# Patient Record
Sex: FEMALE | Race: WHITE | Employment: FULL TIME | URBAN - METROPOLITAN AREA
[De-identification: names, ages, dates, MRNs, and addresses within clinical notes are randomized per-mention and may not be internally consistent; named-entity substitution may affect disease eponyms.]

---

## 2018-01-27 ENCOUNTER — APPOINTMENT (OUTPATIENT)
Dept: GENERAL RADIOLOGY | Facility: CLINIC | Age: 58
End: 2018-01-27
Attending: EMERGENCY MEDICINE
Payer: COMMERCIAL

## 2018-01-27 ENCOUNTER — HOSPITAL ENCOUNTER (EMERGENCY)
Facility: CLINIC | Age: 58
Discharge: HOME | End: 2018-01-27
Attending: EMERGENCY MEDICINE | Admitting: EMERGENCY MEDICINE
Payer: COMMERCIAL

## 2018-01-27 VITALS
WEIGHT: 170 LBS | OXYGEN SATURATION: 96 % | BODY MASS INDEX: 29.02 KG/M2 | TEMPERATURE: 98.2 F | SYSTOLIC BLOOD PRESSURE: 136 MMHG | DIASTOLIC BLOOD PRESSURE: 91 MMHG | RESPIRATION RATE: 18 BRPM | HEIGHT: 64 IN | HEART RATE: 71 BPM

## 2018-01-27 DIAGNOSIS — S42.294A OTHER CLOSED NONDISPLACED FRACTURE OF PROXIMAL END OF RIGHT HUMERUS, INITIAL ENCOUNTER: ICD-10-CM
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
## 2018-01-27 DIAGNOSIS — S52.121A CLOSED DISPLACED FRACTURE OF HEAD OF RIGHT RADIUS, INITIAL ENCOUNTER: ICD-10-CM

## 2018-01-27 PROCEDURE — 73060 X-RAY EXAM OF HUMERUS: CPT | Mod: RT

## 2018-01-27 PROCEDURE — 25000132 ZZH RX MED GY IP 250 OP 250 PS 637: Performed by: EMERGENCY MEDICINE

## 2018-01-27 PROCEDURE — 25000128 H RX IP 250 OP 636: Performed by: EMERGENCY MEDICINE

## 2018-01-27 PROCEDURE — 73080 X-RAY EXAM OF ELBOW: CPT | Mod: RT

## 2018-01-27 PROCEDURE — 73030 X-RAY EXAM OF SHOULDER: CPT | Mod: RT

## 2018-01-27 PROCEDURE — 71045 X-RAY EXAM CHEST 1 VIEW: CPT

## 2018-01-27 PROCEDURE — 99285 EMERGENCY DEPT VISIT HI MDM: CPT

## 2018-01-27 RX ORDER — OXYCODONE HYDROCHLORIDE 5 MG/1
5-10 TABLET ORAL EVERY 6 HOURS PRN
Qty: 12 TABLET | Refills: 0 | Status: SHIPPED | OUTPATIENT
Start: 2018-01-27 | End: 2020-01-03

## 2018-01-27 RX ORDER — IBUPROFEN 600 MG/1
600 TABLET, FILM COATED ORAL ONCE
Status: COMPLETED | OUTPATIENT
Start: 2018-01-27 | End: 2018-01-27

## 2018-01-27 RX ORDER — OXYCODONE HYDROCHLORIDE 5 MG/1
5 TABLET ORAL ONCE
Status: COMPLETED | OUTPATIENT
Start: 2018-01-27 | End: 2018-01-27

## 2018-01-27 RX ORDER — FENTANYL CITRATE 50 UG/ML
100 INJECTION, SOLUTION INTRAMUSCULAR; INTRAVENOUS ONCE
Status: COMPLETED | OUTPATIENT
Start: 2018-01-27 | End: 2018-01-27

## 2018-01-27 RX ORDER — ACETAMINOPHEN 500 MG
1000 TABLET ORAL ONCE
Status: COMPLETED | OUTPATIENT
Start: 2018-01-27 | End: 2018-01-27

## 2018-01-27 RX ADMIN — ACETAMINOPHEN 1000 MG: 500 TABLET, FILM COATED ORAL at 18:22

## 2018-01-27 RX ADMIN — FENTANYL CITRATE 100 MCG: 50 INJECTION INTRAMUSCULAR; INTRAVENOUS at 21:08

## 2018-01-27 RX ADMIN — IBUPROFEN 600 MG: 600 TABLET ORAL at 18:22

## 2018-01-27 RX ADMIN — OXYCODONE HYDROCHLORIDE 5 MG: 5 TABLET ORAL at 21:24

## 2018-01-27 ASSESSMENT — ENCOUNTER SYMPTOMS
HEADACHES: 0
BACK PAIN: 0
VOMITING: 0

## 2018-01-27 NOTE — ED AVS SNAPSHOT
[facility] Emergency Department    [address] [place] [address]    Phone:  [phone]    Fax:  [phone]                                       Juli Mullen   MRN: [id number]    Department:  [facility] Emergency Department   Date of Visit:  1/27/2018           Patient Information     Date Of Birth          1960        Your diagnoses for this visit were:     Fall, initial encounter     Other closed nondisplaced fracture of proximal end of right humerus, initial encounter     Closed displaced fracture of head of right radius, initial encounter        You were seen by Bola Valente MD.        Discharge Instructions       Please make an appointment to follow up with [facility] [phone] HAND SURGERY in 2 days .    Sling until seen by orthopedics    Use tylenol and/or ibuprofen for pain or discomfort    Use Oxycodone for severe pain uncontrolled by above medications    Opioid Medication Information  You have been given a prescription for an opioid (narcotic) pain medicine and/or have received a pain medicine while here in the emergency department. These medicines can make you drowsy or impaired. You must not drive, operate dangerous equipment, or engage in any other dangerous activities while taking these medications. If you drive while taking these medications, you could be arrested for DUI, or driving under the influence. Do not drink any alcohol while you are taking these medications.   Opioid pain medications can cause addiction. If you have a history of chemical dependency of any type, you are at a higher risk of becoming addicted to pain medications. Only take these prescribed medications to treat your pain when all other options have been tried. Take it for as short a time and as few doses as possible. Store your pain pills in a secure place, as they are frequently stolen and provide a dangerous opportunity for children or visitors in  your house to start abusing these powerful medications. We will not replace any lost or stolen medicine. As soon as your pain is better, you should flush all your remaining medication.   Many prescription pain medications contain Tylenol (acetaminophen), including Vicodin, Tylenol #3, Norco, Lortab, and Percocet. You should not take any extra pills of Tylenol if you are using these prescription medications or you can get very sick. Do not ever take more than 4000 mg of acetaminophen in any 24 hour period.  All opioids tend to cause constipation. Drink plenty of water and eat foods that have a lot of fiber, such as fruits, vegetables, prune juice, apple juice and high fiber cereal. Take a laxative if you don t move your bowels at least every other day. Miralax, Milk of Magnesia, Colace, or Senna can be used to keep you regular.                Elbow Fracture    You have a break (fracture) of one or more bones of your elbow joint. This may be a small crack in the bone. Or it may be a major break, with the broken parts pushed out of position.  This fracture usually takes 4 to 12 weeks to heal, depending on the type. The first step in treatment is with a splint or cast. Severe fractures may need surgery to put the bone fragments back into place.  Home care  Follow these guidelines when caring for yourself at home:    Keep your arm elevated to reduce pain and swelling. When sitting or lying down keep your arm above the level of your heart. You can do this by placing your arm on a pillow that rests on your chest or on a pillow at your side. This is most important during the first 2 days (48 hours) after the injury.    Put an ice pack on the injured area. Do this for 20 minutes every 1 to 2 hours the first day. You can make an ice pack by wrapping a plastic bag of ice cubes in a thin towel. As the ice melts, be careful that the cast or splint doesn t get wet. You can place the ice pack inside the sling and directly over the  splint or cast. Continue to use the ice pack 3 to 4 times a day for the next 2 days. Then use the ice pack as needed to ease pain and swelling.    Keep the splint or cast completely dry at all times. Bathe with your splint or cast out of the water. Protect it with a large plastic bag, rubber-banded at the top end. If a fiberglass splint or cast gets wet, you can dry it with a hair dryer.    You may use acetaminophen or ibuprofen to control pain, unless another pain medicine was prescribed. If you have chronic liver or kidney disease, talk with your healthcare provider before using these medicines. Also talk with your provider if you ve had a stomach ulcer or gastrointestinal bleeding.    Don t put creams or objects under the cast if you have itching.  Follow-up care  Follow up with your healthcare provider in 1 week, or as advised. This is to make sure the bone is healing the way it should. If a splint was put on, it may be changed to a cast during your follow-up visit.  X-rays may be taken. You will be told of any new findings that may affect your care.  When to seek medical advice  Call your healthcare provider right away if any of these occur:    The cast or splint cracks    The plaster cast or splint becomes wet or soft    The fiberglass cast or splint stays wet for more than 24 hours    Tightness or pain under the cast or splint gets worse    Bad odor from the cast or wound fluid stains the cast    Fingers become swollen, cold, blue, numb, or tingly    You can t move your fingers    Skin around cast becomes red    Fever of 100.4 F (38 C) or higher, or as directed by your healthcare provider   Date Last Reviewed: 2/6/2017 2000-2017 [employer]. [address] [place] [address], [place] [address]. All rights reserved. This information is not intended as a substitute for professional medical care. Always follow your healthcare professional's instructions.          Shoulder Fracture  You have a break  (fracture) of the shoulder. This may be a small crack in the bone. Or it may be a major break with the broken parts pushed out of position.     If you have only a crack in the bone and no bone fragments are out of place, you will probably be treated with a shoulder immobilizer. This is a special type of sling. Casts are usually not used for this type of fracture. Your bone should heal in 4 to 8 weeks. More serious injuries may need surgery to put the bones back into the correct position for healing.  Home care  Follow these tips to care for yourself at home:    Leave the shoulder immobilizer in place. This will support the injured arm at your side. This is the best position for the bone to heal.    The shoulder immobilizer is adjustable. If it becomes loose, adjust it so that your forearm is level with the ground (horizontal). Your hand should be level with your elbow.    Apply an ice pack to the injured area for 20 minutes every 1 to 2 hours the first day. You can make an ice pack by putting ice cubes in a plastic bag. A bag of frozen peas or something similar works well too. Wrap the bag in a towel before putting it on your shoulder. Continue with ice packs 3 to 4 times a day for the next 2 to 3 days. Then use the ice as needed to relieve pain and swelling.    You may take acetaminophen or ibuprofen to relieve pain, unless another pain medicine was prescribed. If you have chronic liver or kidney disease or ever had a stomach ulcer or gastrointestinal bleeding, talk with your doctor before using these medicines.    Don t take the sling off before your next exam unless you were told to do so. Ask if you should move your elbow, wrist, and hand.  Follow-up care  Follow up with your healthcare provider, or as advised.  A shoulder joint will become stiff if left in a sling for too long. Ask your doctor when it is safe to begin range-of-motion exercises.  When to seek medical advice  Call your healthcare provider right  away if any of these occur:    Your fingers become swollen, cold, blue, numb, or tingly    Your shoulder or upper arm swells a lot or looks very bruised    The pain in your shoulder gets worse    The splint or immobilizer breaks    You have a fever or chills  Date Last Reviewed: 8/1/2016 2000-2017 The "[employer]. [address] [place] [address]. All rights reserved. This information is not intended as a substitute for professional medical care. Always follow your healthcare professional's instructions.              24 Hour Appointment Hotline       To make an appointment at any [facility], call [phone] ([phone]). If you don't have a family doctor or clinic, we will help you find one. [facility] are conveniently located to serve the needs of you and your family.             Review of your medicines      START taking        Dose / Directions Last dose taken    oxyCODONE IR 5 MG tablet   Commonly known as:  ROXICODONE   Dose:  5-10 mg   Quantity:  12 tablet        Take 1-2 tablets (5-10 mg) by mouth every 6 hours as needed for moderate to severe pain   Refills:  0          Our records show that you are taking the medicines listed below. If these are incorrect, please call your family doctor or clinic.        Dose / Directions Last dose taken    ACYCLOVIR PO        Refills:  0                Prescriptions were sent or printed at these locations (1 Prescription)                   Other Prescriptions                Printed at Department/Unit printer (1 of 1)         oxyCODONE IR (ROXICODONE) 5 MG tablet                Procedures and tests performed during your visit     Humerus XR, G/E 2 views, right    XR Chest 1 View    XR Elbow Right G/E 3 Views    XR Shoulder Right G/E 3 Views      Orders Needing Specimen Collection     None      Pending Results     No orders found from 1/25/2018 to 1/28/2018.            Pending Culture Results     No orders found from 1/25/2018 to  1/28/2018.            Pending Results Instructions     If you had any lab results that were not finalized at the time of your Discharge, you can call the ED Lab Result RN at [phone]. You will be contacted by this team for any positive Lab results or changes in treatment. The nurses are available 7 days a week from 10A to 6:30P.  You can leave a message 24 hours per day and they will return your call.        Test Results From Your Hospital Stay        1/27/2018  8:10 PM      Narrative     CHEST ONE VIEW  1/27/2018 7:39 PM     HISTORY: Fall, right shoulder and scapular pain.     COMPARISON: None.        Impression     IMPRESSION: Normal.    AVNI ZEPEDA MD         1/27/2018  8:10 PM      Narrative     RIGHT SHOULDER THREE VIEWS  1/27/2018 7:39 PM     HISTORY: Fall, pain.     COMPARISON: None.        Impression     IMPRESSION: Mildly comminuted fracture of the proximal humerus which  is relatively well aligned is present. There is no evidence for  dislocation.    AVNI ZEPEDA MD         1/27/2018  8:20 PM      Narrative     HUMERUS RIGHT TWO OR MORE VIEWS   1/27/2018 7:40 PM     HISTORY: Fall, pain.     COMPARISON: None.         Impression     IMPRESSION: There is a comminuted proximal humeral neck fracture with  minimal lateral displacement of largest distal fragment. Humeral head  remains well located within the glenoid fossa.    IRINA LEY MD         1/27/2018  8:20 PM      Narrative     ELBOW RIGHT THREE OR MORE VIEWS   1/27/2018 7:40 PM     HISTORY: Fall, pain.     COMPARISON: Humeral x-rays dated 1/27/2018.        Impression     IMPRESSION: Images are limited due to the angulation. There is what  appears to be a comminuted intra-articular and probably displaced  radial head fracture. Joint spaces are grossly maintained. There may  be other fractures which are difficult to see given the limitations of  this study.    IIRNA LEY MD                Clinical Quality Measure: Blood Pressure Screening      "Your blood pressure was checked while you were in the emergency department today. The last reading we obtained was  BP: (!) 160/94 . Please read the guidelines below about what these numbers mean and what you should do about them.  If your systolic blood pressure (the top number) is less than 120 and your diastolic blood pressure (the bottom number) is less than 80, then your blood pressure is normal. There is nothing more that you need to do about it.  If your systolic blood pressure (the top number) is 120-139 or your diastolic blood pressure (the bottom number) is 80-89, your blood pressure may be higher than it should be. You should have your blood pressure rechecked within a year by a primary care provider.  If your systolic blood pressure (the top number) is 140 or greater or your diastolic blood pressure (the bottom number) is 90 or greater, you may have high blood pressure. High blood pressure is treatable, but if left untreated over time it can put you at risk for heart attack, stroke, or kidney failure. You should have your blood pressure rechecked by a primary care provider within the next 4 weeks.  If your provider in the emergency department today gave you specific instructions to follow-up with your doctor or provider even sooner than that, you should follow that instruction and not wait for up to 4 weeks for your follow-up visit.        Thank you for choosing [facility]       Thank you for choosing [facility] for your care. Our goal is always to provide you with excellent care. Hearing back from our patients is one way we can continue to improve our services. Please take a few minutes to complete the written survey that you may receive in the mail after you visit with us. Thank you!        [facility] [employer] Information     [employer] lets you send messages to your doctor, view your test results, renew your prescriptions, schedule appointments and more. To sign up, go to [email or web address] . Click on \"Log in\" on " "the left side of the screen, which will take you to the Welcome page. Then click on \"Sign up Now\" on the right side of the page.     You will be asked to enter the access code listed below, as well as some personal information. Please follow the directions to create your username and password.     Your access code is: [email or web address]  Expires: 2018  9:30 PM     Your access code will  in 90 days. If you need help or a new code, please call your [facility] clinic or [phone].        Care EveryWhere ID     This is your Care EveryWhere ID. This could be used by other organizations to access your [facility] medical records  [id number]        Equal Access to Services     RHONDA COSBY : Marshall Watt, christian bond, vidal nguyen, adalid sahni. So [facility] [phone].    ATENCIÓN: Si habla español, tiene a castaneda disposición servicios gratuitos de asistencia lingüística. Llame al [phone].    We comply with applicable federal civil rights laws and [place] laws. We do not discriminate on the basis of race, color, national origin, age, disability, sex, sexual orientation, or gender identity.            After Visit Summary       This is your record. Keep this with you and show to your community pharmacist(s) and doctor(s) at your next visit.                  "

## 2018-01-27 NOTE — ED AVS SNAPSHOT
[facility] Emergency Department    [address]    [facility] [address]    Phone:  [phone]    Fax:  [phone]                                       Juli Mullen   MRN: [id number]    Department:  [facility] Emergency Department   Date of Visit:  1/27/2018           After Visit Summary Signature Page     I have received my discharge instructions, and my questions have been answered. I have discussed any challenges I see with this plan with the nurse or doctor.    ..........................................................................................................................................  Patient/Patient Representative Signature      ..........................................................................................................................................  Patient Representative Print Name and Relationship to Patient    ..................................................               ................................................  Date                                            Time    ..........................................................................................................................................  Reviewed by Signature/Title    ...................................................              ..............................................  Date                                                            Time

## 2018-01-28 NOTE — ED PROVIDER NOTES
"  History     Chief Complaint:  Fall    HPI   Juli Mullen is a generally healthy 57 year old female who presents with injuries after a fall. The patient states that she was on a ladder painting when she had fallen backwards off the ladder approximately 4 feet at 1500. She had initially visited an urgent care facility, but was sent to the ED for further evaluation. Here, the patient states that her right shoulder had taken most of the impact, but had hit her head. She denies loss of consciousness or vomiting after the fall. She denies use of blood thinners, back or leg pain.     Allergies:  NKDA     Medications:    The patient is currently on no regular medications.      Past Medical History:    The patient denies any significant past medical history.    Past Surgical History:    The patient does not have any pertinent past surgical history  Family  History:    No past pertinent family history.     Social History:  Negative for alcohol use.  Negative for tobacco use.      Review of Systems   Gastrointestinal: Negative for vomiting.   Musculoskeletal: Negative for back pain.        Right shoulder pain    Neurological: Negative for headaches.   All other systems reviewed and are negative.      Physical Exam   First Vitals:  BP: (!) 160/94  Pulse: 71  Heart Rate: 71  Temp: 98.2  F (36.8  C)  Resp: 18  Height: 162.6 cm (5' 4\")  Weight: 77.1 kg (170 lb)  SpO2: 95 %    Physical Exam   HENT:   Head: Atraumatic.   Right Ear: External ear normal.   Left Ear: External ear normal.   Nose: Nose normal.   Eyes: Conjunctivae and lids are normal.   Neck: Neck supple. No tracheal deviation present.   No midline tenderness pain with range of motion   Cardiovascular: Regular rhythm and intact distal pulses.    Pulmonary/Chest: Breath sounds normal. No respiratory distress.   Abdominal: Soft. There is no tenderness. There is no rebound and no guarding.   Musculoskeletal:   Skeletal survey unremarkable with exception of the " below.    Right upper extremity: There is tenderness palpation and mild soft tissue swelling at the right elbow tenderness both over the medial lateral epicondyles.  Limited range of motion secondary to pain no palpable bony deformity.  Distally at the wrist and hand CMS is intact.  There is also tenderness in the proximal humerus.  Limited range of motion at the shoulder secondary to pain.  There is also pain extending posteriorly along the lateral scapula into the acromion.   Neurological:   MAEE, no gross focal motor or sensory deficit   Skin: Skin is warm and dry. She is not diaphoretic.   Psychiatric: She has a normal mood and affect.   Nursing note and vitals reviewed.      Emergency Department Course     Imaging:  Radiographic findings were communicated with the patient who voiced understanding of the findings.  XR Chest 1 view:   Normal. As per radiology.     XR Shoulder, Right, G/E 3 views:   Mildly comminuted fracture of the proximal humerus which  is relatively well aligned is present. There is no evidence for  dislocation. As per radiology.     XR Humerus, Right, G/E 2 views:   There is a comminuted proximal humeral neck fracture with  minimal lateral displacement of largest distal fragment. Humeral head  remains well located within the glenoid fossa. As per radiology.     XR Elbow, Right, G/E 3 views:   Images are limited due to the angulation. There is what  appears to be a comminuted intra-articular and probably displaced  radial head fracture. Joint spaces are grossly maintained. There may  be other fractures which are difficult to see given the limitations of  this study. As per radiology.     Interventions:  1822 Tylenol, 1000 mg, PO  1822 Advil, 600 mg, PO   2108 Fentanyl, 100 mg, Nasal  2124 Oxycodone, 5 mg, PO    Emergency Department Course:  Nursing notes and vitals reviewed.     1806  I performed an exam of the patient as documented above.     Medicine administered as documented above.     The  patient was sent for a chest, shoulder, humerus, and elbow XR while in the emergency department, findings above.      I consulted with Dr. Gee, orthopedics, regarding the patient's history and presentation here in the emergency department.      I rechecked the patient and discussed the results of her workup thus far.     I rechecked the patient.     Findings and plan explained to the Patient. Patient discharged home with instructions regarding supportive care, medications, and reasons to return. The importance of close follow-up was reviewed. The patient was prescribed Oxycodone.      Impression & Plan      Medical Decision Makin-year-old female here with mechanical fall off a ladder of 3-4 feet no significant head injury clinically I do not think she is a CT scan of the head or cervical spine.  Her skeletal survey is most concerning for a proximal humerus fracture versus a right elbow fracture.  There is no neurovascular compromise distally she has no motor or sensory deficits in the right upper extremity.  We will do radiographs of the chest right shoulder and humerus down to the elbow.      Update: Unfortunately radiographs show both a proximal humerus fracture as well as a displaced radial head fracture.  Discussed with orthopedic on-call trauma Dr. Gee. She will need a sling for comfort and close follow-up likely radial head replacement surgery.  We discussed the utility of attempting a closed reduction of this radial head which is likely to be unsuccessful would also be concerned that significantly manipulating at the elbow will cause more pain or worsening shoulder injury.  Discussed these options with the patient we elected to not attempt to relocate the radial head and go ahead with sling for comfort of her follow-up with orthopedic surgery on Monday.  She will remain in a sling until seen by orthopedics return with uncontrolled pain numbness weakness or tingling in the wrist or  hand.        Diagnosis:    ICD-10-CM   1. Fall, initial encounter W19.XXXA   2. Other closed nondisplaced fracture of proximal end of right humerus, initial encounter S42.294A   3. Closed displaced fracture of head of right radius, initial encounter S52.121A       Disposition:  discharged to home    Discharge Medications:   Details   oxyCODONE IR (ROXICODONE) 5 MG tablet Take 1-2 tablets (5-10 mg) by mouth every 6 hours as needed for moderate to severe pain, Disp-12 tablet, R-0, Local Print     I, Stephanie Longoria, am serving as a scribe on 1/27/2018 at 6:06 PM to personally document services performed by Bola Valente MD based on my observations and the provider's statements to me.      Stephanie Longoria  1/27/2018   [facility] EMERGENCY DEPARTMENT       Bola Valente MD  01/28/18 0129

## 2018-01-28 NOTE — DISCHARGE INSTRUCTIONS
Please make an appointment to follow up with [facility] [phone] HAND SURGERY in 2 days .    Sling until seen by orthopedics    Use tylenol and/or ibuprofen for pain or discomfort    Use Oxycodone for severe pain uncontrolled by above medications    Opioid Medication Information  You have been given a prescription for an opioid (narcotic) pain medicine and/or have received a pain medicine while here in the emergency department. These medicines can make you drowsy or impaired. You must not drive, operate dangerous equipment, or engage in any other dangerous activities while taking these medications. If you drive while taking these medications, you could be arrested for DUI, or driving under the influence. Do not drink any alcohol while you are taking these medications.   Opioid pain medications can cause addiction. If you have a history of chemical dependency of any type, you are at a higher risk of becoming addicted to pain medications. Only take these prescribed medications to treat your pain when all other options have been tried. Take it for as short a time and as few doses as possible. Store your pain pills in a secure place, as they are frequently stolen and provide a dangerous opportunity for children or visitors in your house to start abusing these powerful medications. We will not replace any lost or stolen medicine. As soon as your pain is better, you should flush all your remaining medication.   Many prescription pain medications contain Tylenol (acetaminophen), including Vicodin, Tylenol #3, Norco, Lortab, and Percocet. You should not take any extra pills of Tylenol if you are using these prescription medications or you can get very sick. Do not ever take more than 4000 mg of acetaminophen in any 24 hour period.  All opioids tend to cause constipation. Drink plenty of water and eat foods that have a lot of fiber, such as fruits, vegetables, prune juice, apple juice and high fiber cereal. Take  a laxative if you don t move your bowels at least every other day. Miralax, Milk of Magnesia, Colace, or Senna can be used to keep you regular.                Elbow Fracture    You have a break (fracture) of one or more bones of your elbow joint. This may be a small crack in the bone. Or it may be a major break, with the broken parts pushed out of position.  This fracture usually takes 4 to 12 weeks to heal, depending on the type. The first step in treatment is with a splint or cast. Severe fractures may need surgery to put the bone fragments back into place.  Home care  Follow these guidelines when caring for yourself at home:    Keep your arm elevated to reduce pain and swelling. When sitting or lying down keep your arm above the level of your heart. You can do this by placing your arm on a pillow that rests on your chest or on a pillow at your side. This is most important during the first 2 days (48 hours) after the injury.    Put an ice pack on the injured area. Do this for 20 minutes every 1 to 2 hours the first day. You can make an ice pack by wrapping a plastic bag of ice cubes in a thin towel. As the ice melts, be careful that the cast or splint doesn t get wet. You can place the ice pack inside the sling and directly over the splint or cast. Continue to use the ice pack 3 to 4 times a day for the next 2 days. Then use the ice pack as needed to ease pain and swelling.    Keep the splint or cast completely dry at all times. Bathe with your splint or cast out of the water. Protect it with a large plastic bag, rubber-banded at the top end. If a fiberglass splint or cast gets wet, you can dry it with a hair dryer.    You may use acetaminophen or ibuprofen to control pain, unless another pain medicine was prescribed. If you have chronic liver or kidney disease, talk with your healthcare provider before using these medicines. Also talk with your provider if you ve had a stomach ulcer or gastrointestinal  bleeding.    Don t put creams or objects under the cast if you have itching.  Follow-up care  Follow up with your healthcare provider in 1 week, or as advised. This is to make sure the bone is healing the way it should. If a splint was put on, it may be changed to a cast during your follow-up visit.  X-rays may be taken. You will be told of any new findings that may affect your care.  When to seek medical advice  Call your healthcare provider right away if any of these occur:    The cast or splint cracks    The plaster cast or splint becomes wet or soft    The fiberglass cast or splint stays wet for more than 24 hours    Tightness or pain under the cast or splint gets worse    Bad odor from the cast or wound fluid stains the cast    Fingers become swollen, cold, blue, numb, or tingly    You can t move your fingers    Skin around cast becomes red    Fever of 100.4 F (38 C) or higher, or as directed by your healthcare provider   Date Last Reviewed: 2/6/2017 2000-2017 [employer]. [address] [place] [address]. All rights reserved. This information is not intended as a substitute for professional medical care. Always follow your healthcare professional's instructions.          Shoulder Fracture  You have a break (fracture) of the shoulder. This may be a small crack in the bone. Or it may be a major break with the broken parts pushed out of position.     If you have only a crack in the bone and no bone fragments are out of place, you will probably be treated with a shoulder immobilizer. This is a special type of sling. Casts are usually not used for this type of fracture. Your bone should heal in 4 to 8 weeks. More serious injuries may need surgery to put the bones back into the correct position for healing.  Home care  Follow these tips to care for yourself at home:    Leave the shoulder immobilizer in place. This will support the injured arm at your side. This is the best position for the  bone to heal.    The shoulder immobilizer is adjustable. If it becomes loose, adjust it so that your forearm is level with the ground (horizontal). Your hand should be level with your elbow.    Apply an ice pack to the injured area for 20 minutes every 1 to 2 hours the first day. You can make an ice pack by putting ice cubes in a plastic bag. A bag of frozen peas or something similar works well too. Wrap the bag in a towel before putting it on your shoulder. Continue with ice packs 3 to 4 times a day for the next 2 to 3 days. Then use the ice as needed to relieve pain and swelling.    You may take acetaminophen or ibuprofen to relieve pain, unless another pain medicine was prescribed. If you have chronic liver or kidney disease or ever had a stomach ulcer or gastrointestinal bleeding, talk with your doctor before using these medicines.    Don t take the sling off before your next exam unless you were told to do so. Ask if you should move your elbow, wrist, and hand.  Follow-up care  Follow up with your healthcare provider, or as advised.  A shoulder joint will become stiff if left in a sling for too long. Ask your doctor when it is safe to begin range-of-motion exercises.  When to seek medical advice  Call your healthcare provider right away if any of these occur:    Your fingers become swollen, cold, blue, numb, or tingly    Your shoulder or upper arm swells a lot or looks very bruised    The pain in your shoulder gets worse    The splint or immobilizer breaks    You have a fever or chills  Date Last Reviewed: 8/1/2016 2000-2017 [employer]. [address] [place] [address], [place] [address]. All rights reserved. This information is not intended as a substitute for professional medical care. Always follow your healthcare professional's instructions.

## 2019-04-01 NOTE — ED NOTES
Ambulated patient to restroom with assistance.  
Pt reports falling off ladder approximately 4 feet off the ground today at approximately 1500. Pt drove to urgent care then to ED. Pt denies taking any pain medication. Pt fell on L shoulder and has severe pain when moving L arm. Pt able to sit pain free in bed when she does not move her arm. Pt A&Ox4. ABCDs intact.  
27 y/o M no PMH presenting with L eye pain worsening over past 4 days due to possible foreign body 2/2 cement from jackhammer at work. No fevers or other complaints. (+) L scleral injection on exam and decreased visual acuity. No obvious foreign bodies seen. Will initiate fluid irrigation of left eye through sarita flush catheter for possible alkali injury 2/2 cement and will follow with fluorescein and slit lamp examination.

## 2020-01-03 ENCOUNTER — APPOINTMENT (OUTPATIENT)
Dept: CT IMAGING | Facility: CLINIC | Age: 60
End: 2020-01-03
Attending: EMERGENCY MEDICINE
Payer: COMMERCIAL

## 2020-01-03 ENCOUNTER — HOSPITAL ENCOUNTER (OUTPATIENT)
Facility: CLINIC | Age: 60
Setting detail: OBSERVATION
Discharge: HOME | End: 2020-01-04
Attending: EMERGENCY MEDICINE | Admitting: HOSPITALIST
Payer: COMMERCIAL

## 2020-01-03 DIAGNOSIS — K57.20 DIVERTICULITIS OF LARGE INTESTINE WITH ABSCESS WITHOUT BLEEDING: ICD-10-CM

## 2020-01-03 DIAGNOSIS — K57.92 DIVERTICULITIS: Primary | ICD-10-CM

## 2020-01-03 LAB
ALBUMIN SERPL-MCNC: 3.4 G/DL (ref 3.4–5)
ALBUMIN UR-MCNC: 10 MG/DL
ALP SERPL-CCNC: 72 U/L (ref 40–150)
ALT SERPL W P-5'-P-CCNC: 31 U/L (ref 0–50)
ANION GAP SERPL CALCULATED.3IONS-SCNC: 7 MMOL/L (ref 3–14)
APPEARANCE UR: CLEAR
AST SERPL W P-5'-P-CCNC: 22 U/L (ref 0–45)
BASOPHILS # BLD AUTO: 0 10E9/L (ref 0–0.2)
BASOPHILS NFR BLD AUTO: 0.2 %
BILIRUB SERPL-MCNC: 0.6 MG/DL (ref 0.2–1.3)
BILIRUB UR QL STRIP: NEGATIVE
BUN SERPL-MCNC: 13 MG/DL (ref 7–30)
CALCIUM SERPL-MCNC: 8.9 MG/DL (ref 8.5–10.1)
CHLORIDE SERPL-SCNC: 105 MMOL/L (ref 94–109)
CO2 SERPL-SCNC: 26 MMOL/L (ref 20–32)
COLOR UR AUTO: YELLOW
CREAT SERPL-MCNC: 0.59 MG/DL (ref 0.52–1.04)
DIFFERENTIAL METHOD BLD: NORMAL
EOSINOPHIL # BLD AUTO: 0 10E9/L (ref 0–0.7)
EOSINOPHIL NFR BLD AUTO: 0.1 %
ERYTHROCYTE [DISTWIDTH] IN BLOOD BY AUTOMATED COUNT: 12.5 % (ref 10–15)
GFR SERPL CREATININE-BSD FRML MDRD: >90 ML/MIN/{1.73_M2}
GLUCOSE SERPL-MCNC: 99 MG/DL (ref 70–99)
GLUCOSE UR STRIP-MCNC: NEGATIVE MG/DL
HCT VFR BLD AUTO: 40.5 % (ref 35–47)
HGB BLD-MCNC: 13.3 G/DL (ref 11.7–15.7)
HGB UR QL STRIP: ABNORMAL
IMM GRANULOCYTES # BLD: 0 10E9/L (ref 0–0.4)
IMM GRANULOCYTES NFR BLD: 0.5 %
KETONES UR STRIP-MCNC: 20 MG/DL
LEUKOCYTE ESTERASE UR QL STRIP: ABNORMAL
LIPASE SERPL-CCNC: 323 U/L (ref 73–393)
LYMPHOCYTES # BLD AUTO: 1.3 10E9/L (ref 0.8–5.3)
LYMPHOCYTES NFR BLD AUTO: 15.7 %
MCH RBC QN AUTO: 30.9 PG (ref 26.5–33)
MCHC RBC AUTO-ENTMCNC: 32.8 G/DL (ref 31.5–36.5)
MCV RBC AUTO: 94 FL (ref 78–100)
MONOCYTES # BLD AUTO: 0.6 10E9/L (ref 0–1.3)
MONOCYTES NFR BLD AUTO: 6.9 %
MUCOUS THREADS #/AREA URNS LPF: PRESENT /LPF
NEUTROPHILS # BLD AUTO: 6.3 10E9/L (ref 1.6–8.3)
NEUTROPHILS NFR BLD AUTO: 76.6 %
NITRATE UR QL: NEGATIVE
NRBC # BLD AUTO: 0 10*3/UL
NRBC BLD AUTO-RTO: 0 /100
PH UR STRIP: 6.5 PH (ref 5–7)
PLATELET # BLD AUTO: 268 10E9/L (ref 150–450)
POTASSIUM SERPL-SCNC: 3.8 MMOL/L (ref 3.4–5.3)
PROT SERPL-MCNC: 7.5 G/DL (ref 6.8–8.8)
RBC # BLD AUTO: 4.3 10E12/L (ref 3.8–5.2)
RBC #/AREA URNS AUTO: 6 /HPF (ref 0–2)
SODIUM SERPL-SCNC: 138 MMOL/L (ref 133–144)
SOURCE: ABNORMAL
SP GR UR STRIP: 1.02 (ref 1–1.03)
SQUAMOUS #/AREA URNS AUTO: <1 /HPF (ref 0–1)
UROBILINOGEN UR STRIP-MCNC: 2 MG/DL (ref 0–2)
WBC # BLD AUTO: 8.2 10E9/L (ref 4–11)
WBC #/AREA URNS AUTO: 1 /HPF (ref 0–5)

## 2020-01-03 PROCEDURE — 25000128 H RX IP 250 OP 636: Performed by: HOSPITALIST

## 2020-01-03 PROCEDURE — 25800030 ZZH RX IP 258 OP 636: Performed by: HOSPITALIST

## 2020-01-03 PROCEDURE — 25000125 ZZHC RX 250: Performed by: EMERGENCY MEDICINE

## 2020-01-03 PROCEDURE — G0378 HOSPITAL OBSERVATION PER HR: HCPCS

## 2020-01-03 PROCEDURE — 99285 EMERGENCY DEPT VISIT HI MDM: CPT | Mod: 25

## 2020-01-03 PROCEDURE — 83690 ASSAY OF LIPASE: CPT | Performed by: EMERGENCY MEDICINE

## 2020-01-03 PROCEDURE — 96375 TX/PRO/DX INJ NEW DRUG ADDON: CPT

## 2020-01-03 PROCEDURE — 25000128 H RX IP 250 OP 636: Performed by: EMERGENCY MEDICINE

## 2020-01-03 PROCEDURE — 25000132 ZZH RX MED GY IP 250 OP 250 PS 637: Performed by: HOSPITALIST

## 2020-01-03 PROCEDURE — 81001 URINALYSIS AUTO W/SCOPE: CPT | Performed by: EMERGENCY MEDICINE

## 2020-01-03 PROCEDURE — 96365 THER/PROPH/DIAG IV INF INIT: CPT | Mod: 59

## 2020-01-03 PROCEDURE — 80053 COMPREHEN METABOLIC PANEL: CPT | Performed by: EMERGENCY MEDICINE

## 2020-01-03 PROCEDURE — 96376 TX/PRO/DX INJ SAME DRUG ADON: CPT

## 2020-01-03 PROCEDURE — 99220 ZZC INITIAL OBSERVATION CARE,LEVL III: CPT | Performed by: HOSPITALIST

## 2020-01-03 PROCEDURE — 74177 CT ABD & PELVIS W/CONTRAST: CPT

## 2020-01-03 PROCEDURE — 85025 COMPLETE CBC W/AUTO DIFF WBC: CPT | Performed by: EMERGENCY MEDICINE

## 2020-01-03 RX ORDER — NALOXONE HYDROCHLORIDE 0.4 MG/ML
.1-.4 INJECTION, SOLUTION INTRAMUSCULAR; INTRAVENOUS; SUBCUTANEOUS
Status: DISCONTINUED | OUTPATIENT
Start: 2020-01-03 | End: 2020-01-04 | Stop reason: HOSPADM

## 2020-01-03 RX ORDER — MORPHINE SULFATE 4 MG/ML
4 INJECTION, SOLUTION INTRAMUSCULAR; INTRAVENOUS ONCE
Status: COMPLETED | OUTPATIENT
Start: 2020-01-03 | End: 2020-01-03

## 2020-01-03 RX ORDER — HYDROCHLOROTHIAZIDE 25 MG/1
25 TABLET ORAL DAILY
Status: DISCONTINUED | OUTPATIENT
Start: 2020-01-03 | End: 2020-01-04 | Stop reason: HOSPADM

## 2020-01-03 RX ORDER — ACYCLOVIR 400 MG/1
400 TABLET ORAL 3 TIMES DAILY PRN
COMMUNITY

## 2020-01-03 RX ORDER — ONDANSETRON 2 MG/ML
4 INJECTION INTRAMUSCULAR; INTRAVENOUS EVERY 6 HOURS PRN
Status: DISCONTINUED | OUTPATIENT
Start: 2020-01-03 | End: 2020-01-04 | Stop reason: HOSPADM

## 2020-01-03 RX ORDER — IOPAMIDOL 755 MG/ML
500 INJECTION, SOLUTION INTRAVASCULAR ONCE
Status: COMPLETED | OUTPATIENT
Start: 2020-01-03 | End: 2020-01-03

## 2020-01-03 RX ORDER — IBUPROFEN 400 MG/1
400 TABLET, FILM COATED ORAL EVERY 6 HOURS PRN
Status: DISCONTINUED | OUTPATIENT
Start: 2020-01-03 | End: 2020-01-04 | Stop reason: HOSPADM

## 2020-01-03 RX ORDER — LOSARTAN POTASSIUM 50 MG/1
50 TABLET ORAL DAILY
COMMUNITY

## 2020-01-03 RX ORDER — ACETAMINOPHEN 325 MG/1
650 TABLET ORAL EVERY 4 HOURS PRN
Status: DISCONTINUED | OUTPATIENT
Start: 2020-01-03 | End: 2020-01-04 | Stop reason: HOSPADM

## 2020-01-03 RX ORDER — HYDROCHLOROTHIAZIDE 25 MG/1
25 TABLET ORAL DAILY
COMMUNITY

## 2020-01-03 RX ORDER — OXYCODONE HYDROCHLORIDE 5 MG/1
5-10 TABLET ORAL
Status: DISCONTINUED | OUTPATIENT
Start: 2020-01-03 | End: 2020-01-04 | Stop reason: HOSPADM

## 2020-01-03 RX ORDER — ACETAMINOPHEN 650 MG/1
650 SUPPOSITORY RECTAL EVERY 4 HOURS PRN
Status: DISCONTINUED | OUTPATIENT
Start: 2020-01-03 | End: 2020-01-04 | Stop reason: HOSPADM

## 2020-01-03 RX ORDER — TRAZODONE HYDROCHLORIDE 50 MG/1
50 TABLET, FILM COATED ORAL
COMMUNITY

## 2020-01-03 RX ORDER — TRAZODONE HYDROCHLORIDE 50 MG/1
50 TABLET, FILM COATED ORAL
Status: DISCONTINUED | OUTPATIENT
Start: 2020-01-03 | End: 2020-01-04 | Stop reason: HOSPADM

## 2020-01-03 RX ORDER — SODIUM CHLORIDE, SODIUM LACTATE, POTASSIUM CHLORIDE, CALCIUM CHLORIDE 600; 310; 30; 20 MG/100ML; MG/100ML; MG/100ML; MG/100ML
INJECTION, SOLUTION INTRAVENOUS CONTINUOUS
Status: DISCONTINUED | OUTPATIENT
Start: 2020-01-03 | End: 2020-01-04 | Stop reason: HOSPADM

## 2020-01-03 RX ORDER — ONDANSETRON 4 MG/1
4 TABLET, ORALLY DISINTEGRATING ORAL EVERY 6 HOURS PRN
Status: DISCONTINUED | OUTPATIENT
Start: 2020-01-03 | End: 2020-01-04 | Stop reason: HOSPADM

## 2020-01-03 RX ORDER — CIPROFLOXACIN 2 MG/ML
400 INJECTION, SOLUTION INTRAVENOUS EVERY 12 HOURS
Status: DISCONTINUED | OUTPATIENT
Start: 2020-01-03 | End: 2020-01-04 | Stop reason: HOSPADM

## 2020-01-03 RX ORDER — LOSARTAN POTASSIUM 50 MG/1
50 TABLET ORAL DAILY
Status: DISCONTINUED | OUTPATIENT
Start: 2020-01-03 | End: 2020-01-04 | Stop reason: HOSPADM

## 2020-01-03 RX ADMIN — LOSARTAN POTASSIUM 50 MG: 50 TABLET, FILM COATED ORAL at 15:00

## 2020-01-03 RX ADMIN — HYDROCHLOROTHIAZIDE 25 MG: 25 TABLET ORAL at 15:00

## 2020-01-03 RX ADMIN — SERTRALINE HYDROCHLORIDE 50 MG: 50 TABLET ORAL at 15:00

## 2020-01-03 RX ADMIN — MORPHINE SULFATE 4 MG: 4 INJECTION INTRAVENOUS at 11:37

## 2020-01-03 RX ADMIN — SODIUM CHLORIDE 62 ML: 9 INJECTION, SOLUTION INTRAVENOUS at 10:28

## 2020-01-03 RX ADMIN — TAZOBACTAM SODIUM AND PIPERACILLIN SODIUM 3.38 G: 375; 3 INJECTION, SOLUTION INTRAVENOUS at 11:37

## 2020-01-03 RX ADMIN — IOPAMIDOL 89 ML: 755 INJECTION, SOLUTION INTRAVENOUS at 10:28

## 2020-01-03 RX ADMIN — METRONIDAZOLE 500 MG: 500 INJECTION, SOLUTION INTRAVENOUS at 16:50

## 2020-01-03 RX ADMIN — METRONIDAZOLE 500 MG: 500 INJECTION, SOLUTION INTRAVENOUS at 22:30

## 2020-01-03 RX ADMIN — ACETAMINOPHEN 650 MG: 325 TABLET, FILM COATED ORAL at 16:58

## 2020-01-03 RX ADMIN — CIPROFLOXACIN 400 MG: 2 INJECTION, SOLUTION INTRAVENOUS at 18:39

## 2020-01-03 RX ADMIN — SODIUM CHLORIDE, POTASSIUM CHLORIDE, SODIUM LACTATE AND CALCIUM CHLORIDE: 600; 310; 30; 20 INJECTION, SOLUTION INTRAVENOUS at 15:00

## 2020-01-03 ASSESSMENT — MIFFLIN-ST. JEOR: SCORE: 1347.42

## 2020-01-03 ASSESSMENT — ENCOUNTER SYMPTOMS
BLOOD IN STOOL: 0
NAUSEA: 1
VOMITING: 0
ABDOMINAL PAIN: 1
DYSURIA: 0
DIARRHEA: 0
HEMATURIA: 0
FEVER: 0

## 2020-01-03 NOTE — ED PROVIDER NOTES
History     Chief Complaint:  Abdominal Pain    HPI   Juli Mullen is a 59 year old female with a history of LARRY and diverticulitis, who presents with abdominal pain. The patient reports that she has a history of diverticulitis and yesterday was seen in urgent care for abdominal pain that started early yesterday morning. The patient was prescribed antibiotics, Augmentin, and was discharged home. She has taken 2 tablets of this and since then has not had any improvement of her pain. Today, the patient states that she has right sided abdominal pain with associated nausea. She had taken ibuprofen last night but has not had any other pain medications. The patient denies fever, vomiting, diarrhea, blood in stool, dysuria, hematuria. She has not had any abdominal surgeries other than a c section.     Allergies:  Amlodipine  Sulfa Drugs      Medications:    Acyclovir   Augmentin     Past Medical History:    Adenomatous polyp of colon  LARRY  Diverticulitis     Past Surgical History:    Adenoidectomy  c section  Tonsillectomy     Family History:    Father: hypertension  Mother: hypertension   Sister: obesity  Brother: obesity     Social History:  The patient was unaccompanied to the ED.  Smoking Status: Never Smoker  Smokeless Tobacco: Never Used  Alcohol Use: Positive  Drug Use: Negative  PCP: Radha Mckeon   Marital Status:        Review of Systems   Constitutional: Negative for fever.   Gastrointestinal: Positive for abdominal pain and nausea. Negative for blood in stool, diarrhea and vomiting.   Genitourinary: Negative for dysuria and hematuria.   All other systems reviewed and are negative.    Physical Exam     Patient Vitals for the past 24 hrs:   BP Temp Temp src Pulse Heart Rate Resp SpO2 Weight   01/03/20 1315 102/62 -- -- 61 -- -- 92 % --   01/03/20 1300 (!) 140/74 -- -- 58 -- -- 95 % --   01/03/20 1245 -- -- -- -- -- -- 92 % --   01/03/20 1230 -- -- -- -- -- -- 92 % --   01/03/20 1130 -- -- -- -- -- -- 97 %  --   01/03/20 1115 -- -- -- -- -- -- 95 % --   01/03/20 1000 -- -- -- -- -- -- 97 % --   01/03/20 0945 (!) 162/88 -- -- 62 -- -- 99 % --   01/03/20 0900 (!) 145/100 -- -- -- -- -- 97 % --   01/03/20 0855 (!) 145/100 98.2  F (36.8  C) Oral -- 66 16 100 % 79.8 kg (176 lb)      Physical Exam    Eyes:               Sclera white; Pupils are equal and round  ENT:                External ears and nares normal  CV:                  Rate as above with regular rhythm   Resp:               Breath sounds clear and equal bilaterally                          Non-labored, no retractions or accessory muscle use  GI:                   Abdomen is soft, maximal tenderness at McBurney's point                          No rebound tenderness or peritoneal features  MS:                  Moves all extremities  Skin:                Warm and dry  Neuro:             Speech is normal and fluent. No apparent deficit.    Emergency Department Course     Imaging:  Radiology findings were communicated with the patient who voiced understanding of the findings.    CT Abdomen Pelvis w Contrast  1. Colonic, predominantly sigmoid diverticulosis without significant  pericolonic fat stranding, finding consistent with acute  diverticulitis. There is a 2.6 cm loculated collection along the  sigmoid colon, likely represents a small abscess. No evidence of  colonic perforation.  2. Hepatic steatosis.  3. Mild diffuse dilatation of the main pancreatic duct measuring up to  4 mm. No definite obstructing mass visualized.  Reading per radiology      Laboratory:  Laboratory findings were communicated with the patient who voiced understanding of the findings.    UA: Urineketon 20 (A), Blood small (A), Protein Albumin 10 (A), Leukocyte Esterase trace (A), RBC 6 (H), Mucous present (A), o/w WNL.    CBC: WBC 8.2, HGB 13.3,   CMP: WNL (Creatinine 0.59)    Lipase: 323    Interventions:  1137 Zosyn 3.375 g IV  1137 morphine 4 mg IV     Emergency Department  Course:    0856 Nursing notes and vitals reviewed. I performed an exam of the patient as documented above.     0910 IV was inserted and blood was drawn for laboratory testing, results above.     0944 The patient provided a urine sample here in the emergency department. This was sent for laboratory testing, findings above.     1021 The patient was sent for a CT while in the emergency department, results above.      1109 I spoke with Connie Meza PA-C of the colorectal service regarding patient's presentation, findings, and plan of care.     1136 I spoke with Dr. Zamudoi of the IR service regarding patient's presentation, findings, and plan of care.     1245 I spoke with Dr. Anyi Gee [facility] patient's presentation, findings, and plan of care.     Prior to admission, I personally reviewed the results with the patient and all related questions were answered. The patient verbalized understanding and is amenable to plan.     Impression & Plan      Medical Decision Making:  Juli Mullen is a 59 year old female who presents to the emergency department today for evaluation of lower and right sided abdominal pain. She was already started on treatment for diverticulitis but exam is concerning for possible underlying appendicitis. CT scan was performed showing a diverticulitis with associated fluid collection. Labs are not consistent with sepsis. There is no alternate etiology of symptoms such as ureteral stone, torsion, or obstruction. Case was discussed first with colorectal and then with IR. This is too small for them to place a drain in it. She will be started on IV antibiotics and admitted to the hospital.     Diagnosis:    ICD-10-CM    1. Diverticulitis of large intestine with abscess without bleeding K57.20      Disposition:   The patient is admitted into the care of Dr. Miller.     Alok Disclosure:  I, Orla Severson, am serving as a  scribe at 9:04 AM on 1/3/2020 to document services personally performed by Maye Berkowitz MD based on my observations and the provider's statements to me.   [facility] EMERGENCY DEPARTMENT       Maye Berkowitz MD  01/03/20 4571

## 2020-01-03 NOTE — CONSULTS
[facility]  Colon and Rectal Surgery Consult Note  Name: Juli Mullen    MRN: [id number]  YOB: 1960    Age: 59 year old  Date of admission: 1/3/2020  Primary care provider: Radha Mckeon     Requesting Physician:  Dr. Berkowitz  Reason for consult:  Diverticulitis with abscess           History of Present Illness:   Juli Mullen is a 59 year old female, seen at the request of Dr. Berkowitz, who presents with abdominal pain which started yesterday morning around 3:00am.  This continued throughout the day and she went to Urgent Care.  She was prescribed Augmentin which she has taken 2 doses of.  The pain did not improve and she presented to the ER today.  The pain is across the lower abdomen, though seems to be worse on the right side, as well as into her back.  It is pretty constant.  She did have some associated nausea.  No emesis.  No fever.  She was sick about 5 days ago with a possible gastroenteritis so she has not been eating much in this past week and has lost about 10 pounds.  She generally has daily bowel movements where she will have several stools within a short amount of time in the morning.  She usually feels completely empty but then will have another bowel movement.  Stools range from loose to formed.  In the past few days she has felt more urges but without much stool production.  She is passing less flatus.  She has a history of diverticulitis and this is the third episode where she had enough pain to seek care.  She was able to be treated with oral antibiotics as an outpatient the previous two times.  She does reports though she she has intermittent cramping or abdominal discomfort where she will wonder if it is an episode of diverticulitis but the symptoms will improve or resolve before she seeks care.  This happens several times per year.  She No hematochezia, dysuria, chest pain, shortness of breath.      WBC 8.2.  Electrolytes are within normal limits.  Urinalysis is positive  for ketones, protein, small amount of blood, trace leukocyte esterase, and mucous. CT of the abdomen and pelvis shows sigmoid diverticulosis with fat stranding and a small 2.6 x 2.4cm pericolonic fluid collection with no evidence of colon perforation.  There is also hepatic steatosis and mild diffuse dilatation of the main pancreatic duct    Colonoscopy History:  2016; history of polyps, gets colonoscopies every 5 years     Surgical History:              Past Medical History:   No past medical history on file.          Past Surgical History:     Past Surgical History:   Procedure Laterality Date     addenoids       GYN SURGERY       tonsils                 Social History:     Social History     Tobacco Use     Smoking status: Never Smoker     Smokeless tobacco: Never Used   Substance Use Topics     Alcohol use: Yes     Comment: 5-6/week             Family History:   No family history on file.          Allergies:     Allergies   Allergen Reactions     Amlodipine Other (See Comments)     LE edema at the 10 mg dose.  Tolerated the 5 mg dose.     Sulfa Drugs Nausea and Vomiting             Medications:       morphine (PF)  4 mg Intravenous Once     piperacillin-tazobactam  3.375 g Intravenous Once             Review of Systems:   A comprehensive greater than 10 system review of systems was carried out.  Pertinent positives and negatives are noted above.  Otherwise negative for contributory info.            Physical Exam:     Blood pressure (!) 162/88, pulse 62, temperature 98.2  F (36.8  C), temperature source Oral, resp. rate 16, weight 79.8 kg (176 lb), SpO2 97 %.  No intake or output data in the 24 hours ending 20 1136      EXAM:  GEN: Awake alert and oriented, appears stated age  PULM: Non-labored breathing with normal respiratory effort  ABD: Soft, tender along both lower quadrants, non-distended. No rebound or guarding   NEURO: CN II-XII grossly intact  MSK: extremeties with no clubbing, cyanosis  or edema  PSYCH: responsive, alert, cooperative; oriented x3; appropriate mood and affect  EXT/SKIN: inspection reveals no rashes, lesions or ulcers, normal coloring         Data Reviewed:     Recent Results (from the past 24 hour(s))   CT Abdomen Pelvis w Contrast    Narrative    CT ABDOMEN AND PELVIS WITH CONTRAST   1/3/2020 10:36 AM     HISTORY:  Abdomen pain, appendicitis suspected.    TECHNIQUE:  CT abdomen and pelvis with 89 mL Isovue-370 IV. Radiation  dose for this scan was reduced using automated exposure control,  adjustment of the mA and/or kV according to patient size, or iterative  reconstruction technique.    COMPARISON: None available.    FINDINGS:   Lung bases: Minimal bibasilar atelectasis.    Abdomen/pelvis:  1.4 cm hypoattenuating area in hepatic segment VII  (series 3 image 46), could represent liver cysts. Diffuse  hypoattenuation of the liver, likely due to underlying hepatic  steatosis. The gallbladder is unremarkable. No splenomegaly. No  adrenal nodules. Mild main pancreatic ductal dilatation measuring up  to 4 mm in the pancreatic body. No definite solid pancreatic mass  visualized.    No abnormally dilated bowel loops. Colonic, predominantly sigmoid  diverticulosis with significant pericolonic fat stranding along the  sigmoid colon with a small pericolonic collection measuring  approximately 2.6 x 2.4 cm (series 3 image 153); no evidence of  colonic perforation. The appendix is visualized and appears normal.    Bones and soft tissues: No suspicious osseous lesion. Small  fat-containing umbilical/periumbilical hernia.      Impression    IMPRESSION:  1. Colonic, predominantly sigmoid diverticulosis without significant  pericolonic fat stranding, finding consistent with acute  diverticulitis. There is a 2.6 cm loculated collection along the  sigmoid colon, likely represents a small abscess. No evidence of  colonic perforation.  2. Hepatic steatosis.  3. Mild diffuse dilatation of the main  pancreatic duct measuring up to  4 mm. No definite obstructing mass visualized.       Recent Labs   Lab 01/03/20  0910   WBC 8.2   HGB 13.3   HCT 40.5   MCV 94        Recent Labs   Lab 01/03/20  0910      POTASSIUM 3.8   CHLORIDE 105   CO2 26   ANIONGAP 7   GLC 99   BUN 13   CR 0.59   GFRESTIMATED >90   GFRESTBLACK >90   DOTTIE 8.9   PROTTOTAL 7.5   ALBUMIN 3.4   BILITOTAL 0.6   ALKPHOS 72   AST 22   ALT 31         Assessment and Plan:   Juli is a 60yo woman with past history of diverticulitis who presents with acute diverticulitis with abscess.  Recommended IR drainage if possible.  Would recommend IV antibiotics, IV fluids and bowel rest/NPO.  We discussed the recommendation for conservative measures but if her clinical status changes, we would possibly recommend surgery.  She would like to avoid surgery at this time if possible.  We discussed that she may need a colonoscopy in about 6-8 weeks to clear the colon.  She also is interested in discussing elective sigmoid resection given her history of recurrent diverticulitis.        Plan:  1. Admit to med/surg floor  2. Surgery: No emergent surgery indicated at this time  3. Diet: NPO/sips with meds ok  4. IV Fluids  5. Antibiotics:  IV Zosyn has been ordered  6. I&O s:  strict I&O s  7. Labs:   - Reviewed: by myself  - Ordered: none   8. Imaging:   - I have personally viewed: CT abd/pelvis  9. Activity: ambulate as tolerated, encourage OOB  10. DVT prophylaxis: SCD s  11. This plan has been discussed with Dr. Madrigal    Patient specific identified risk factors considered as part of today s evaluation include: none    Additional history obtained from Care Everywhere.  Time spent on consultation: 35minutes, greater than 50 percent of the total encounter time is spent in counseling and/or coordination of care          Connie Meza PA-C  [facility]  Phone:  [phone]

## 2020-01-03 NOTE — ED TRIAGE NOTES
"Pt reports, \"I have had diverticulitis in the past and went into urgent care yesterday thinking that this could be a flare up and and they sent me home on antibiotics and to come back in if things weren't getting better, pain is not getting better and noted some blood in the stool, no diarrhea. VSS and ABC's intact.  "

## 2020-01-03 NOTE — PHARMACY-ADMISSION MEDICATION HISTORY
Admission medication history interview status for this patient is complete. See [facility] admission navigator for allergy information, prior to admission medications and immunization status.     Medication history interview source(s):Patient  Medication history resources (including written lists, pill bottles, clinic record): [facility] [place]    Changes made to PTA medication list:  Added: all  Deleted: oxycodone  Changed: added dose to acyclovir    Actions taken by pharmacist (provider contacted, etc):None     Additional medication history information:None    Medication reconciliation/reorder completed by provider prior to medication history?  No     Do you take OTC medications (eg tylenol, ibuprofen, fish oil, eye/ear drops, etc)? No     For patients on insulin therapy: No        Prior to Admission medications    Medication Sig Last Dose Taking? Auth Provider   amoxicillin-clavulanate (AUGMENTIN) 875-125 MG tablet Take 1 tablet by mouth 3 times daily 1/3/2020 at 0600 Yes Unknown, Entered By History   hydrochlorothiazide (HYDRODIURIL) 25 MG tablet Take 25 mg by mouth daily 1/1/2020 Yes Unknown, Entered By History   losartan (COZAAR) 50 MG tablet Take 50 mg by mouth daily 1/1/2020 Yes Unknown, Entered By History   sertraline (ZOLOFT) 50 MG tablet Take 50 mg by mouth daily 1/1/2020 Yes Unknown, Entered By History   traZODone (DESYREL) 50 MG tablet Take 50 mg by mouth nightly as needed for sleep Past Month at Unknown time Yes Unknown, Entered By History   acyclovir (ZOVIRAX) 400 MG tablet Take 400 mg by mouth 3 times daily as needed (flare) More than a month at Unknown time  Unknown, Entered By History

## 2020-01-03 NOTE — PLAN OF CARE
ROOM # 213-1    Living Situation (if not independent, order SW consult): home alone   Facility name:  : [facility] (s[facility]) [phone]    Activity level at baseline: ind   Activity level on admit: ind       Patient registered to observation; given Patient Bill of Rights; given the opportunity to ask questions about observation status and their plan of care.  Patient has been oriented to the observation room, bathroom and call light is in place.    Discussed discharge goals and expectations with patient/family.

## 2020-01-03 NOTE — H&P
[facility]    History and Physical - Hospitalist Service       Date of Admission:  1/3/2020    Assessment & Plan   Juli Mullen is a 59 year old female with history diverticulitis (never hospitalized), HTN, anxiety admitted on 1/3/2020 with lower abdominal pain and CT showing acute colonic diverticulitis with possible small abscess.    Acute diverticulitis with possible small abscess    - seen by Colorectal Surgery in the ED    - no intervention    - received IV zosyn in the ED: I feel she will likely be fine with Cipro/Flagyl as this is what she has been treated with in the past and they are easily converted to PO    - patient appears wee: no fever, no elevation of WBC    - PO pain meds with motrin, tylenol and oxy    HTN    - cont home hydrochlorothiazide and losartan    Anxiety    - cont home zoloft    Full code    Offered to call family. She stated she would keep them updated.       Diet: Advance Diet as Tolerated: Clear Liquid Diet    DVT Prophylaxis: Low Risk/Ambulatory with no VTE prophylaxis indicated  Henriquez Catheter: not present  Code Status: Full Code      Disposition Plan   Expected discharge: Tomorrow, recommended to prior living arrangement once adequate pain management/ tolerating PO medications.  Entered: Manuel Miller MD 01/03/2020, 3:08 PM     The patient's care was discussed with the Bedside Nurse, Patient and ED provider.    Manuel Miller MD  [facility]    ______________________________________________________________________    Chief Complaint   Lower abdominal pain    History is obtained from the patient    History of Present Illness   Juli Mullen is a 59 year old female with history diverticulitis (never hospitalized), HTN, anxiety admitted on 1/3/2020 with lower abdominal pain and CT showing acute colonic diverticulitis with possible small abscess. Patient states over the weekend she had a viral gastroenteritis illness with nausea, vomiting, abdominal pain and  "diarrhea and fevers. She has a daughter who was sick with the same symptoms. These resolved, but Wed night she started having lower abdominal pain. She went to Urgent Care and (without imaging) was diagnosed and treated for a diverticulitis with Augmentin. They mentioned the possibility of appendicitis so when the patient's symptoms did not resolve she came to the ED today to be \"assessed for appendicitis\". Patient denies chest pain, n/v/d. No current fever or chills. No URI symptoms or urinary symptoms. Her pain is mild and a 3/10. She feels fairly well and states she is surprised she is being admitted and would like to go home. I did explain her CT finding and Colorectal's recs, so she agrees to stay overnight. She has had diverticulitis in the past but has never been hospitalized.    Review of Systems    The 10 point Review of Systems is negative other than noted in the HPI or here.     Past Medical History    I have reviewed this patient's medical history and updated it with pertinent information if needed.   HTN, anxiety, polyps, diverticulitis       Past Surgical History   I have reviewed this patient's surgical history and updated it with pertinent information if needed.  Past Surgical History:   Procedure Laterality Date     addenoids       GYN SURGERY       tonsils         Social History   I have reviewed this patient's social history and updated it with pertinent information if needed.  Social History     Tobacco Use     Smoking status: Never Smoker     Smokeless tobacco: Never Used   Substance Use Topics     Alcohol use: Yes     Comment: 5-6/week     Drug use: No       Family History   I have reviewed this patient's family history and updated it with pertinent information if needed.   Mom and dad both have heart issues and HTN  Mom has polyps and arthritis    Prior to Admission Medications   Prior to Admission Medications   Prescriptions Last Dose Informant Patient Reported? Taking?   acyclovir (ZOVIRAX) " 400 MG tablet More than a month at Unknown time  Yes No   Sig: Take 400 mg by mouth 3 times daily as needed (flare)   amoxicillin-clavulanate (AUGMENTIN) 875-125 MG tablet 1/3/2020 at 0600  Yes Yes   Sig: Take 1 tablet by mouth 3 times daily   hydrochlorothiazide (HYDRODIURIL) 25 MG tablet 1/1/2020  Yes Yes   Sig: Take 25 mg by mouth daily   losartan (COZAAR) 50 MG tablet 1/1/2020  Yes Yes   Sig: Take 50 mg by mouth daily   sertraline (ZOLOFT) 50 MG tablet 1/1/2020  Yes Yes   Sig: Take 50 mg by mouth daily   traZODone (DESYREL) 50 MG tablet Past Month at Unknown time  Yes Yes   Sig: Take 50 mg by mouth nightly as needed for sleep      Facility-Administered Medications: None     Allergies   Allergies   Allergen Reactions     Amlodipine Other (See Comments)     LE edema at the 10 mg dose.  Tolerated the 5 mg dose.     Sulfa Drugs Nausea and Vomiting       Physical Exam   Vital Signs: Temp: 96.2  F (35.7  C) Temp src: Oral BP: 132/65 Pulse: 61 Heart Rate: 60 Resp: 16 SpO2: 96 % O2 Device: None (Room air)    Weight: 175 lbs 12.8 oz    Constitutional: awake, alert, cooperative, no apparent distress, and appears stated age  Eyes: Lids and lashes normal, pupils equal, round and reactive to light, extra ocular muscles intact, sclera clear, conjunctiva normal  ENT: Normocephalic, without obvious abnormality, atraumatic, sinuses nontender on palpation, external ears without lesions, oral pharynx with moist mucous membranes, tonsils without erythema or exudates, gums normal and good dentition.  Respiratory: No increased work of breathing, good air exchange, clear to auscultation bilaterally, no crackles or wheezing  Cardiovascular: Normal apical impulse, regular rate and rhythm, normal S1 and S2, no S3 or S4, and no murmur noted  GI: soft +BS. Low bilat abdo tenderness  Skin: no bruising or bleeding  Musculoskeletal: no lower extremity pitting edema present    Data   Data reviewed today: I reviewed all medications, new labs  and imaging results over the last 24 hours. I personally reviewed the abdominal CT image(s) showing colonic diverticulitis with possible small abscess.    Most Recent 3 CBC's:  Recent Labs   Lab Test 01/03/20  0910   WBC 8.2   HGB 13.3   MCV 94        Most Recent 3 BMP's:  Recent Labs   Lab Test 01/03/20  0910      POTASSIUM 3.8   CHLORIDE 105   CO2 26   BUN 13   CR 0.59   ANIONGAP 7   DOTTIE 8.9   GLC 99     Most Recent 2 LFT's:  Recent Labs   Lab Test 01/03/20  0910   AST 22   ALT 31   ALKPHOS 72   BILITOTAL 0.6     Most Recent Urinalysis:  Recent Labs   Lab Test 01/03/20  0944   COLOR Yellow   APPEARANCE Clear   URINEGLC Negative   URINEBILI Negative   URINEKETONE 20*   SG 1.024   UBLD Small*   URINEPH 6.5   PROTEIN 10*   NITRITE Negative   LEUKEST Trace*   RBCU 6*   WBCU 1     Recent Results (from the past 24 hour(s))   CT Abdomen Pelvis w Contrast    Narrative    CT ABDOMEN AND PELVIS WITH CONTRAST   1/3/2020 10:36 AM     HISTORY:  Abdomen pain, appendicitis suspected.    TECHNIQUE:  CT abdomen and pelvis with 89 mL Isovue-370 IV. Radiation  dose for this scan was reduced using automated exposure control,  adjustment of the mA and/or kV according to patient size, or iterative  reconstruction technique.    COMPARISON: None available.    FINDINGS:   Lung bases: Minimal bibasilar atelectasis.    Abdomen/pelvis:  1.4 cm hypoattenuating area in hepatic segment VII  (series 3 image 46), could represent liver cysts. Diffuse  hypoattenuation of the liver, likely due to underlying hepatic  steatosis. The gallbladder is unremarkable. No splenomegaly. No  adrenal nodules. Mild main pancreatic ductal dilatation measuring up  to 4 mm in the pancreatic body. No definite solid pancreatic mass  visualized.    No abnormally dilated bowel loops. Colonic, predominantly sigmoid  diverticulosis with significant pericolonic fat stranding along the  sigmoid colon with a small pericolonic collection measuring  approximately  2.6 x 2.4 cm (series 3 image 153); no evidence of  colonic perforation. The appendix is visualized and appears normal.    Bones and soft tissues: No suspicious osseous lesion. Small  fat-containing umbilical/periumbilical hernia.      Impression    IMPRESSION:  1. Colonic, predominantly sigmoid diverticulosis with significant  pericolonic fat stranding, finding consistent with acute  diverticulitis. There is a 2.6 cm loculated collection along the  sigmoid colon, likely represents a small abscess. No evidence of  colonic perforation.  2. Hepatic steatosis.  3. Mild diffuse dilatation of the main pancreatic duct measuring up to  4 mm. No definite obstructing mass visualized.    NEHEMIAH EARLY MD

## 2020-01-03 NOTE — ED NOTES
"[facility]  ED Nurse Handoff Report    Juli Mullen is a 59 year old female   ED Chief complaint: Abdominal Pain  . ED Diagnosis:   Final diagnoses:   Diverticulitis of large intestine with abscess without bleeding     Allergies:   Allergies   Allergen Reactions     Amlodipine Other (See Comments)     LE edema at the 10 mg dose.  Tolerated the 5 mg dose.     Sulfa Drugs Nausea and Vomiting       Code Status: Full Code  Activity level - Baseline/Home:  Independent. Activity Level - Current:   Assist X 1. Lift room needed: No. Bariatric: No   Needed: No   Isolation: No. Infection: Not Applicable.     Vital Signs:   Vitals:    01/03/20 0945 01/03/20 1000 01/03/20 1115 01/03/20 1130   BP: (!) 162/88      Pulse: 62      Resp:       Temp:       TempSrc:       SpO2: 99% 97% 95% 97%   Weight:           Cardiac Rhythm:  ,      Pain level:    Patient confused: No. Patient Falls Risk: Yes.   Elimination Status: Has voided   Patient Report - Initial Complaint: Right abdominal pain. Focused Assessment:    09:39 Gastrointestinal Gastrointestinal - GI WDL: -WDL except; all  Abdominal Appearance: rounded; nondistended  Abdominal Palpation: RLQ  Percussion: RLQ  Bowel Sounds: RLQ  Gastrointestinal Comment:  (Pt states, \"I havent been having Diarrhea since the weekend\"\)  Gastrointestinal - RLQ Bowel Sounds: audible and normoactive; audible and active in all quadrants       Tests Performed: EKG, Labs, Imaging. Abnormal Results:   Labs Ordered and Resulted from Time of ED Arrival Up to the Time of Departure from the ED   ROUTINE UA WITH MICROSCOPIC - Abnormal; Notable for the following components:       Result Value    Ketones Urine 20 (*)     Blood Urine Small (*)     Protein Albumin Urine 10 (*)     Leukocyte Esterase Urine Trace (*)     RBC Urine 6 (*)     Mucous Urine Present (*)     All other components within normal limits   COMPREHENSIVE METABOLIC PANEL   LIPASE   CBC WITH PLATELETS DIFFERENTIAL "   PERIPHERAL IV CATHETER   FREE WATER     CT Abdomen Pelvis w Contrast   Preliminary Result   IMPRESSION:   1. Colonic, predominantly sigmoid diverticulosis without significant   pericolonic fat stranding, finding consistent with acute   diverticulitis. There is a 2.6 cm loculated collection along the   sigmoid colon, likely represents a small abscess. No evidence of   colonic perforation.   2. Hepatic steatosis.   3. Mild diffuse dilatation of the main pancreatic duct measuring up to   4 mm. No definite obstructing mass visualized.        .   Treatments provided: See MAR  Family Comments: N/A  OBS brochure/video discussed/provided to patient:  Yes  ED Medications:   Medications   piperacillin-tazobactam (ZOSYN) infusion 3.375 g (3.375 g Intravenous New Bag 1/3/20 1137)   CT Scan Flush (62 mLs Intravenous Given 1/3/20 1028)   iopamidol (ISOVUE-370) solution 500 mL (89 mLs Intravenous Given 1/3/20 1028)   morphine (PF) injection 4 mg (4 mg Intravenous Given 1/3/20 1137)     Drips infusing:  Yes (antibiotics)  For the majority of the shift, the patient's behavior Green. Interventions performed were N/A.     Severe Sepsis OR Septic Shock Diagnosis Present: No      ED Nurse Name/Phone Number: Stephanie Maria RN,   11:41 AM    RECEIVING UNIT ED HANDOFF REVIEW    Above ED Nurse Handoff Report was reviewed: Yes  Reviewed by: Amy Simmons RN on January 3, 2020 at 2:01 PM

## 2020-01-04 VITALS
HEIGHT: 64 IN | DIASTOLIC BLOOD PRESSURE: 66 MMHG | BODY MASS INDEX: 30.01 KG/M2 | RESPIRATION RATE: 16 BRPM | SYSTOLIC BLOOD PRESSURE: 113 MMHG | OXYGEN SATURATION: 96 % | HEART RATE: 54 BPM | TEMPERATURE: 97.6 F | WEIGHT: 175.8 LBS

## 2020-01-04 LAB
BASOPHILS # BLD AUTO: 0 10E9/L (ref 0–0.2)
BASOPHILS NFR BLD AUTO: 0.1 %
DIFFERENTIAL METHOD BLD: NORMAL
EOSINOPHIL # BLD AUTO: 0.1 10E9/L (ref 0–0.7)
EOSINOPHIL NFR BLD AUTO: 0.9 %
ERYTHROCYTE [DISTWIDTH] IN BLOOD BY AUTOMATED COUNT: 12.5 % (ref 10–15)
HCT VFR BLD AUTO: 38.1 % (ref 35–47)
HGB BLD-MCNC: 12.2 G/DL (ref 11.7–15.7)
IMM GRANULOCYTES # BLD: 0 10E9/L (ref 0–0.4)
IMM GRANULOCYTES NFR BLD: 0.5 %
LYMPHOCYTES # BLD AUTO: 1.7 10E9/L (ref 0.8–5.3)
LYMPHOCYTES NFR BLD AUTO: 22.1 %
MCH RBC QN AUTO: 30.8 PG (ref 26.5–33)
MCHC RBC AUTO-ENTMCNC: 32 G/DL (ref 31.5–36.5)
MCV RBC AUTO: 96 FL (ref 78–100)
MONOCYTES # BLD AUTO: 0.5 10E9/L (ref 0–1.3)
MONOCYTES NFR BLD AUTO: 6.1 %
NEUTROPHILS # BLD AUTO: 5.6 10E9/L (ref 1.6–8.3)
NEUTROPHILS NFR BLD AUTO: 70.3 %
NRBC # BLD AUTO: 0 10*3/UL
NRBC BLD AUTO-RTO: 0 /100
PLATELET # BLD AUTO: 257 10E9/L (ref 150–450)
RBC # BLD AUTO: 3.96 10E12/L (ref 3.8–5.2)
WBC # BLD AUTO: 7.9 10E9/L (ref 4–11)

## 2020-01-04 PROCEDURE — 25000132 ZZH RX MED GY IP 250 OP 250 PS 637: Performed by: HOSPITALIST

## 2020-01-04 PROCEDURE — 85025 COMPLETE CBC W/AUTO DIFF WBC: CPT | Performed by: HOSPITALIST

## 2020-01-04 PROCEDURE — 96376 TX/PRO/DX INJ SAME DRUG ADON: CPT

## 2020-01-04 PROCEDURE — G0378 HOSPITAL OBSERVATION PER HR: HCPCS

## 2020-01-04 PROCEDURE — 25000128 H RX IP 250 OP 636: Performed by: HOSPITALIST

## 2020-01-04 PROCEDURE — 36415 COLL VENOUS BLD VENIPUNCTURE: CPT | Performed by: HOSPITALIST

## 2020-01-04 RX ORDER — METRONIDAZOLE 500 MG/1
500 TABLET ORAL 3 TIMES DAILY
Qty: 30 TABLET | Refills: 0 | Status: ON HOLD | OUTPATIENT
Start: 2020-01-04 | End: 2020-02-24

## 2020-01-04 RX ORDER — IBUPROFEN 400 MG/1
400 TABLET, FILM COATED ORAL EVERY 6 HOURS PRN
COMMUNITY
Start: 2020-01-04

## 2020-01-04 RX ORDER — CIPROFLOXACIN 500 MG/1
500 TABLET, FILM COATED ORAL 2 TIMES DAILY
Qty: 20 TABLET | Refills: 0 | Status: ON HOLD | OUTPATIENT
Start: 2020-01-04 | End: 2020-02-24

## 2020-01-04 RX ORDER — ACETAMINOPHEN 325 MG/1
650 TABLET ORAL EVERY 4 HOURS PRN
COMMUNITY
Start: 2020-01-04

## 2020-01-04 RX ADMIN — HYDROCHLOROTHIAZIDE 25 MG: 25 TABLET ORAL at 07:38

## 2020-01-04 RX ADMIN — METRONIDAZOLE 500 MG: 500 INJECTION, SOLUTION INTRAVENOUS at 03:31

## 2020-01-04 RX ADMIN — CIPROFLOXACIN 400 MG: 2 INJECTION, SOLUTION INTRAVENOUS at 06:06

## 2020-01-04 RX ADMIN — ACETAMINOPHEN 650 MG: 325 TABLET, FILM COATED ORAL at 07:38

## 2020-01-04 RX ADMIN — ACETAMINOPHEN 650 MG: 325 TABLET, FILM COATED ORAL at 01:59

## 2020-01-04 RX ADMIN — SERTRALINE HYDROCHLORIDE 50 MG: 50 TABLET ORAL at 07:38

## 2020-01-04 RX ADMIN — LOSARTAN POTASSIUM 50 MG: 50 TABLET, FILM COATED ORAL at 07:38

## 2020-01-04 NOTE — PLAN OF CARE
"PRIMARY DIAGNOSIS: Diverticulitis    OUTPATIENT/OBSERVATION GOALS TO BE MET BEFORE DISCHARGE  1. Orthostatic performed: No    2. Tolerating PO fluid and/or antibiotics (if applicable): Yes    3. Nausea/Vomiting/Diarrhea symptoms improved: Yes    4. Pain status: Improved-controlled with oral pain medications.    5. Return to near baseline physical activity: Yes    Discharge Planner Nurse   Safe discharge environment identified: Yes  Barriers to discharge: Yes       Entered by: Magui Menjviar 01/03/2020      Please review provider order for any additional goals.   Nurse to notify provider when observation goals have been met and patient is ready for discharge.    Patient tolerated clear liquid diet. Diet orders state to advance diet as tolerated, patient wished to eat a low fiber diet. Pt tolerated diet without difficulty. Denies excess pain. Pt had tylenol x1 for discomfort. No nausea. Up to bathroom ad raman. States \"feeling better\" and hope to discharge to home tomorrow.   "

## 2020-01-04 NOTE — PLAN OF CARE
PRIMARY DIAGNOSIS: Diverticulitis     OUTPATIENT/OBSERVATION GOALS TO BE MET BEFORE DISCHARGE  1. Orthostatic performed: No    2. Tolerating PO fluid and/or antibiotics (if applicable): Yes    3. Nausea/Vomiting/Diarrhea symptoms improved: Yes    4. Pain status: Pain free. Currently denies pain    5. Return to near baseline physical activity: Yes    Discharge Planner Nurse   Safe discharge environment identified: Yes  Barriers to discharge: Yes       Entered by: Adebayo Pond 01/03/2020 9:45 PM     Please review provider order for any additional goals.   Nurse to notify provider when observation goals have been met and patient is ready for discharge.  Temp: 97.6  F (36.4  C) Temp src: Oral BP: (!) 82/44 Pulse: 61 Heart Rate: 70 Resp: 16 SpO2: 96 % O2 Device: None (Room air)    Pt A&Ox4. Pt denies pain, V/D, numbness, or tingling. Pt reports nausea earlier in day, however declines currently. Pt tolerated Low fiber diet for dinner without concern. PIV- LR infusing @ 100 mL/hr. Pt up Ind. Plan: Pt reports wanting to discharge @ 0815 tomorrow.

## 2020-01-04 NOTE — PLAN OF CARE
PRIMARY DIAGNOSIS: Diverticulitis     OUTPATIENT/OBSERVATION GOALS TO BE MET BEFORE DISCHARGE  1. Orthostatic performed: No    2. Tolerating PO fluid and/or antibiotics (if applicable): Yes    3. Nausea/Vomiting/Diarrhea symptoms improved: Yes    4. Pain status: Pain free. Currently denies pain    5. Return to near baseline physical activity: Yes    Discharge Planner Nurse   Safe discharge environment identified: Yes  Barriers to discharge: Yes       Entered by: Adebayo Pond 01/04/2020 5:07 AM     Please review provider order for any additional goals.   Nurse to notify provider when observation goals have been met and patient is ready for discharge.  Temp: 97.1  F (36.2  C) Temp src: Oral BP: 102/58 Pulse: 61 Heart Rate: 50 Resp: 18 SpO2: 92 % O2 Device: None (Room air)    Pt A&Ox4. Pt denies pain, V/D, numbness, or tingling. Pt reports nausea earlier in day, however declines currently. Pt tolerated Low fiber diet for dinner without concern. PIV- LR infusing @ 100 mL/hr. Pt up Ind. Plan: Pt reports wanting to discharge @ 0815 tomorrow.   Pt reports increase in pain level 3/10, requesting tylenol, 650 mg tylenol given.  Pt sleeping

## 2020-01-04 NOTE — PLAN OF CARE
PRIMARY DIAGNOSIS: Diverticulitis     OUTPATIENT/OBSERVATION GOALS TO BE MET BEFORE DISCHARGE  1. Orthostatic performed: No    2. Tolerating PO fluid and/or antibiotics (if applicable): Yes    3. Nausea/Vomiting/Diarrhea symptoms improved: Yes    4. Pain status: Pain free. Currently denies pain    5. Return to near baseline physical activity: Yes    Discharge Planner Nurse   Safe discharge environment identified: Yes  Barriers to discharge: Yes       Entered by: Adebayo Pond 01/04/2020 3:35 AM     Please review provider order for any additional goals.   Nurse to notify provider when observation goals have been met and patient is ready for discharge.  Temp: 95.9  F (35.5  C) Temp src: Oral BP: 90/56 Pulse: 61 Heart Rate: 64 Resp: 16 SpO2: 94 % O2 Device: None (Room air)    Pt A&Ox4. Pt denies pain, V/D, numbness, or tingling. Pt reports nausea earlier in day, however declines currently. Pt tolerated Low fiber diet for dinner without concern. PIV- LR infusing @ 100 mL/hr. Pt up Ind. Plan: Pt reports wanting to discharge @ 0815 tomorrow.   Pt reports increase in pain level 3/10, requesting tylenol, 650 mg tylenol given.

## 2020-01-04 NOTE — PROGRESS NOTES
COLON & RECTAL SURGERY  PROGRESS NOTE    January 4, 2020    SUBJECTIVE: Abdominal pain significantly improved.  Tolerating low fiber diet.  No fevers or chills.    OBJECTIVE:  Temp:  [95.9  F (35.5  C)-98.6  F (37  C)] 97.6  F (36.4  C)  Pulse:  [53-61] 54  Heart Rate:  [50-70] 50  Resp:  [16-18] 16  BP: ()/(44-75) 113/66  SpO2:  [92 %-97 %] 96 %    Intake/Output Summary (Last 24 hours) at 1/4/2020 1024  Last data filed at 1/4/2020 0852  Gross per 24 hour   Intake 240 ml   Output --   Net 240 ml       GENERAL:  Awake, alert, no acute distress  EXTREMITIES: Warm and well perfused, no edema   ABDOMEN:  Soft, non tender, mild distension. No guarding, rigidity, or peritoneal signs.       LABS:  Lab Results   Component Value Date    WBC 7.9 01/04/2020     Lab Results   Component Value Date    HGB 12.2 01/04/2020     Lab Results   Component Value Date    HCT 38.1 01/04/2020     Lab Results   Component Value Date     01/04/2020     Last Basic Metabolic Panel:  Lab Results   Component Value Date     01/03/2020      Lab Results   Component Value Date    POTASSIUM 3.8 01/03/2020     Lab Results   Component Value Date    CHLORIDE 105 01/03/2020     Lab Results   Component Value Date    DOTTIE 8.9 01/03/2020     Lab Results   Component Value Date    CO2 26 01/03/2020     Lab Results   Component Value Date    BUN 13 01/03/2020     Lab Results   Component Value Date    CR 0.59 01/03/2020     Lab Results   Component Value Date    GLC 99 01/03/2020       ASSESSMENT/PLAN: Juli Mullen is a 59 year old female with acute perforated diverticulitis, improving with conservative management.    Low fiber diet.   Oral antibiotics.  Plan for 7 to 10-day course as an outpatient.  Follow-up in 6 to 8 weeks for colonoscopy.  This will be coordinated through my office.  We will plan to discuss risks and benefits of elective sigmoid colon resection as an outpatient.  Okay to discharge home today.  If she experiences fevers  increasing abdominal pain or p.o. intolerance she should call the office or return to the emergency department to be evaluated.      For questions/paging, please contact the CRS office at [phone].    Nora Neal MD  [facility]   Office: [phone]  Pager: [phone]  1/4/2020   10:24 AM

## 2020-01-04 NOTE — DISCHARGE SUMMARY
Patient alert and oriented  Peripheral IV removed  VSS, on room air  Discharge medications given to patient  Discharge instructions discussed with patient, all questions answered  Business card with colorectal info given  Low fiber diet education sheet given  Patient declined wheelchair and walked off unit with all belongings  Self transporting home  OBSERVATION patient END time: 1024

## 2020-01-23 NOTE — DISCHARGE SUMMARY
"[facility]  Hospitalist Discharge Summary       Date of Admission:  1/3/2020  Date of Discharge:  1/4/2020 10:34 AM  Discharging Provider: Manuel Miller MD      Discharge Diagnoses   Diverticulitis with possible abscess    Follow-ups Needed After Discharge   Follow-up Appointments     Follow-up and recommended labs and tests       Follow up with primary care provider, Radha Mckeon, within 7 days for   hospital follow- up.  The following labs/tests are recommended: none.             Unresulted Labs Ordered in the Past 30 Days of this Admission     No orders found from 12/4/2019 to 1/4/2020.      These results will be followed up by NA    Discharge Disposition   Discharged to home  Condition at discharge: Stable    Hospital Course   Juli Mullen is a 59 year old female with history diverticulitis (never hospitalized), HTN, anxiety admitted on 1/3/2020 with lower abdominal pain and CT showing acute colonic diverticulitis with possible small abscess. Patient states over the weekend she had a viral gastroenteritis illness with nausea, vomiting, abdominal pain and diarrhea and fevers. She has a daughter who was sick with the same symptoms. These resolved, but Wed night she started having lower abdominal pain. She went to Urgent Care and (without imaging) was diagnosed and treated for a diverticulitis with Augmentin. They mentioned the possibility of appendicitis so when the patient's symptoms did not resolve she came to the ED today to be \"assessed for appendicitis\". Patient denies chest pain, n/v/d. No current fever or chills. No URI symptoms or urinary symptoms. Her pain is mild and a 3/10. She feels fairly well and states she is surprised she is being admitted and would like to go home. I did explain her CT finding and Colorectal's recs, so she agrees to stay overnight. She has had diverticulitis in the past but has never been hospitalized.  Patient did well overnight. I did contact Colorectal who felt " she was clear for discharge. Patient felt well. No complaints. She was discharged home on Cipro/Flagyl.    Consultations This Hospital Stay   None    Code Status   Prior    Time Spent on this Encounter   I, Manuel Miller MD, personally saw the patient today and spent greater than 30 minutes discharging this patient.       Manuel Miller MD  [facility]  ______________________________________________________________________    Physical Exam   Vital Signs:                    Weight: 175 lbs 12.8 oz  Constitutional: awake, alert, cooperative, no apparent distress, and appears stated age  Eyes: Lids and lashes normal, pupils equal, round and reactive to light, extra ocular muscles intact, sclera clear, conjunctiva normal  ENT: Normocephalic, without obvious abnormality, atraumatic, sinuses nontender on palpation, external ears without lesions, oral pharynx with moist mucous membranes, tonsils without erythema or exudates, gums normal and good dentition.  Respiratory: No increased work of breathing, good air exchange, clear to auscultation bilaterally, no crackles or wheezing  Cardiovascular: Normal apical impulse, regular rate and rhythm, normal S1 and S2, no S3 or S4, and no murmur noted  GI: No scars, normal bowel sounds, soft, non-distended, non-tender, no masses palpated, no hepatosplenomegally  Skin: no bruising or bleeding  Musculoskeletal: no lower extremity pitting edema present       Primary Care Physician   Radha Mckeon    Discharge Orders      Reason for your hospital stay    diverticulitis     Follow-up and recommended labs and tests     Follow up with primary care provider, Radha Mckeon, within 7 days for hospital follow- up.  The following labs/tests are recommended: none.     Activity    Your activity upon discharge: activity as tolerated     Diet    Follow this diet upon discharge: Orders Placed This Encounter      Advance Diet as Tolerated: Low Fiber       Significant Results and  Procedures   Results for orders placed or performed during the hospital encounter of 01/03/20   CT Abdomen Pelvis w Contrast    Narrative    CT ABDOMEN AND PELVIS WITH CONTRAST   1/3/2020 10:36 AM     HISTORY:  Abdomen pain, appendicitis suspected.    TECHNIQUE:  CT abdomen and pelvis with 89 mL Isovue-370 IV. Radiation  dose for this scan was reduced using automated exposure control,  adjustment of the mA and/or kV according to patient size, or iterative  reconstruction technique.    COMPARISON: None available.    FINDINGS:   Lung bases: Minimal bibasilar atelectasis.    Abdomen/pelvis:  1.4 cm hypoattenuating area in hepatic segment VII  (series 3 image 46), could represent liver cysts. Diffuse  hypoattenuation of the liver, likely due to underlying hepatic  steatosis. The gallbladder is unremarkable. No splenomegaly. No  adrenal nodules. Mild main pancreatic ductal dilatation measuring up  to 4 mm in the pancreatic body. No definite solid pancreatic mass  visualized.    No abnormally dilated bowel loops. Colonic, predominantly sigmoid  diverticulosis with significant pericolonic fat stranding along the  sigmoid colon with a small pericolonic collection measuring  approximately 2.6 x 2.4 cm (series 3 image 153); no evidence of  colonic perforation. The appendix is visualized and appears normal.    Bones and soft tissues: No suspicious osseous lesion. Small  fat-containing umbilical/periumbilical hernia.      Impression    IMPRESSION:  1. Colonic, predominantly sigmoid diverticulosis with significant  pericolonic fat stranding, finding consistent with acute  diverticulitis. There is a 2.6 cm loculated collection along the  sigmoid colon, likely represents a small abscess. No evidence of  colonic perforation.  2. Hepatic steatosis.  3. Mild diffuse dilatation of the main pancreatic duct measuring up to  4 mm. No definite obstructing mass visualized.    NEHEMIAH EARLY MD       Discharge Medications   Discharge  Medication List as of 1/4/2020 10:06 AM      START taking these medications    Details   acetaminophen (TYLENOL) 325 MG tablet Take 2 tablets (650 mg) by mouth every 4 hours as needed for mild pain, OTC      ciprofloxacin (CIPRO) 500 MG tablet Take 1 tablet (500 mg) by mouth 2 times daily for 10 days, Disp-20 tablet, R-0, E-Prescribe      ibuprofen (ADVIL/MOTRIN) 400 MG tablet Take 1 tablet (400 mg) by mouth every 6 hours as needed for moderate pain, OTC      metroNIDAZOLE (FLAGYL) 500 MG tablet Take 1 tablet (500 mg) by mouth 3 times daily for 10 days, Disp-30 tablet, R-0, E-Prescribe         CONTINUE these medications which have NOT CHANGED    Details   hydrochlorothiazide (HYDRODIURIL) 25 MG tablet Take 25 mg by mouth daily, Historical      losartan (COZAAR) 50 MG tablet Take 50 mg by mouth daily, Historical      sertraline (ZOLOFT) 50 MG tablet Take 50 mg by mouth daily, Historical      traZODone (DESYREL) 50 MG tablet Take 50 mg by mouth nightly as needed for sleep, Historical      acyclovir (ZOVIRAX) 400 MG tablet Take 400 mg by mouth 3 times daily as needed (flare), Historical         STOP taking these medications       amoxicillin-clavulanate (AUGMENTIN) 875-125 MG tablet Comments:   Reason for Stopping:             Allergies   Allergies   Allergen Reactions     Amlodipine Other (See Comments)     LE edema at the 10 mg dose.  Tolerated the 5 mg dose.     Sulfa Drugs Nausea and Vomiting

## 2020-02-24 ENCOUNTER — HOSPITAL ENCOUNTER (OUTPATIENT)
Facility: CLINIC | Age: 60
Discharge: HOME | End: 2020-02-24
Attending: COLON & RECTAL SURGERY | Admitting: COLON & RECTAL SURGERY
Payer: COMMERCIAL

## 2020-02-24 VITALS
BODY MASS INDEX: 29.37 KG/M2 | WEIGHT: 172 LBS | HEIGHT: 64 IN | DIASTOLIC BLOOD PRESSURE: 76 MMHG | OXYGEN SATURATION: 94 % | HEART RATE: 53 BPM | SYSTOLIC BLOOD PRESSURE: 132 MMHG | RESPIRATION RATE: 16 BRPM

## 2020-02-24 LAB — COLONOSCOPY: NORMAL

## 2020-02-24 PROCEDURE — 88305 TISSUE EXAM BY PATHOLOGIST: CPT | Performed by: COLON & RECTAL SURGERY

## 2020-02-24 PROCEDURE — 25000128 H RX IP 250 OP 636: Performed by: COLON & RECTAL SURGERY

## 2020-02-24 PROCEDURE — G0500 MOD SEDAT ENDO SERVICE >5YRS: HCPCS | Performed by: COLON & RECTAL SURGERY

## 2020-02-24 PROCEDURE — 88305 TISSUE EXAM BY PATHOLOGIST: CPT | Mod: 26 | Performed by: COLON & RECTAL SURGERY

## 2020-02-24 PROCEDURE — 45380 COLONOSCOPY AND BIOPSY: CPT | Performed by: COLON & RECTAL SURGERY

## 2020-02-24 RX ORDER — ONDANSETRON 2 MG/ML
4 INJECTION INTRAMUSCULAR; INTRAVENOUS EVERY 6 HOURS PRN
Status: DISCONTINUED | OUTPATIENT
Start: 2020-02-24 | End: 2020-02-24 | Stop reason: HOSPADM

## 2020-02-24 RX ORDER — ONDANSETRON 4 MG/1
4 TABLET, ORALLY DISINTEGRATING ORAL EVERY 6 HOURS PRN
Status: DISCONTINUED | OUTPATIENT
Start: 2020-02-24 | End: 2020-02-24 | Stop reason: HOSPADM

## 2020-02-24 RX ORDER — NALOXONE HYDROCHLORIDE 0.4 MG/ML
.1-.4 INJECTION, SOLUTION INTRAMUSCULAR; INTRAVENOUS; SUBCUTANEOUS
Status: DISCONTINUED | OUTPATIENT
Start: 2020-02-24 | End: 2020-02-24 | Stop reason: HOSPADM

## 2020-02-24 RX ORDER — FLUMAZENIL 0.1 MG/ML
0.2 INJECTION, SOLUTION INTRAVENOUS
Status: DISCONTINUED | OUTPATIENT
Start: 2020-02-24 | End: 2020-02-24 | Stop reason: HOSPADM

## 2020-02-24 RX ORDER — ONDANSETRON 2 MG/ML
4 INJECTION INTRAMUSCULAR; INTRAVENOUS
Status: CANCELLED | OUTPATIENT
Start: 2020-02-24

## 2020-02-24 RX ORDER — FENTANYL CITRATE 50 UG/ML
INJECTION, SOLUTION INTRAMUSCULAR; INTRAVENOUS PRN
Status: DISCONTINUED | OUTPATIENT
Start: 2020-02-24 | End: 2020-02-24 | Stop reason: HOSPADM

## 2020-02-24 RX ORDER — LIDOCAINE 40 MG/G
CREAM TOPICAL
Status: CANCELLED | OUTPATIENT
Start: 2020-02-24

## 2020-02-24 ASSESSMENT — MIFFLIN-ST. JEOR: SCORE: 1330.19

## 2020-02-24 NOTE — DISCHARGE INSTRUCTIONS
Eating a High-Fiber Diet  Fiber is what gives strength and structure to plants. Most grains, beans, vegetables, and fruits contain fiber. Foods rich in fiber are often low in calories and fat, and they fill you up more. They may also reduce your risks for certain health problems. To find out the amount of fiber in canned, packaged, or frozen foods, read the  Nutrition Facts  label. It tells you how much fiber is in a serving.      Types of Fiber and Their Benefits  There are two types of fiber: insoluble and soluble. They both aid digestion and help you maintain a healthy weight.  Insoluble fiber: This is found in whole grains, cereals, certain fruits and vegetables (such as apple skin, corn, and carrots). Insoluble fiber may prevent constipation and reduce the risk of certain types of cancer.   Soluble fiber: This type of fiber is in oats, beans, and certain fruits and vegetables (such as strawberries and peas). Soluble fiber can reduce cholesterol (which may help lower the risk of heart disease), and helps control blood sugar levels.  Look for High-Fiber Foods  Whole-grain breads and cereals: Try to eat 6-8 ounces a day. Include wheat and oat bran cereals, whole-wheat muffins or toast, and corn tortillas in your meals.  Fruits: Try to eat 2 cups a day. Apples, oranges, strawberries, pears, and bananas are good sources. (Note: Fruit juice is low in fiber.)  Vegetables: Try to eat 3 cups a day. Add asparagus, carrots, broccoli, peas, and corn to your meals.  Legumes (beans): One cup of cooked lentils gives you over 15 grams of fiber. Try navy beans, lentils, and chickpeas.  Seeds:  A small handful of seeds gives you about 3 grams of fiber. Try sunflower seeds.    Keep Track of Your Fiber  A healthy diet includes 31 grams of fiber a day if you have a 2,000-calorie diet. Keep track of how much fiber you eat. Start by reading food labels. Then eat a variety of foods high in fiber. Ask your doctor about supplemental  fiber products.            1602-0313 [facility], [address] [place] [address], [place] [address]. All rights reserved. This information is not intended as a substitute for professional medical care. Always follow your healthcare professional's instructions.        Understanding Diverticulosis and Diverticulitis     Pouches or diverticula usually occur in the lower part of the colon called the sigmoid.      Diverticulitis occurs when the pouches become inflamed.     The colon (large intestine) is the last part of the digestive tract. It absorbs water from stool and changes it from a liquid to a solid. In certain cases, small pouches called diverticula can form in the colon wall. This condition is called diverticulosis. The pouches can become infected. If this happens, it becomes a more serious problem called diverticulitis. These problems can be painful. But they can be managed.   Managing Your Condition  Diet changes or taking medications are often tried first. These may be enough to bring relief. If the case is bad, surgery may be done. You and your doctor can discuss the plan that is best for you.  If You Have Diverticulosis  Diet changes are often enough to control symptoms. The main changes are adding fiber (roughage) and drinking more water. Fiber absorbs water as it travels through your colon. This helps your stool stay soft and move smoothly. Water helps this process. If needed, you may be told to take over-the-counter stool softeners. To help relieve pain, antispasmodic medications may be prescribed.  If You Have Diverticulitis  Treatment depends on how bad your symptoms are.  For mild symptoms: You may be put on a liquid diet for a short time. You may also be prescribed antibiotics. If these two steps relieve your symptoms, you may then be prescribed a high-fiber diet. If you still have symptoms, your doctor will discuss further treatment options with you.  For severe symptoms: You may need to be admitted to  the hospital. There, you can be given IV antibiotics and fluids. Once symptoms are under control, the above treatments may be tried. If these don t control your condition, your doctor may discuss the option of having surgery with you.  [facility] Colon Health  Help keep your colon healthy with a diet that includes plenty of high-fiber fruits, vegetables, and whole grains. Drink plenty of liquids like water and juice. Your doctor may also recommend avoiding seeds and nuts.          [id number] [facility], [address] [place] [address], [place] [address]. All rights reserved. This information is not intended as a substitute for professional medical care. Always follow your healthcare professional's instructions.

## 2020-02-24 NOTE — H&P
Pre-Endoscopy History and Physical     Juli Mullen MRN# [id number]   YOB: 1960 Age: 59 year old     Date of Procedure: 2/24/2020  Primary care provider: Radha Mckeon  Type of Endoscopy: colonoscopy  Reason for Procedure: follow up diverticulitis  Type of Anesthesia Anticipated: Moderate Sedation    HPI:    Juli is a 59 year old female who will be undergoing the above procedure.      A history and physical has been performed. The patient's medications and allergies have been reviewed. The risks and benefits of the procedure and the sedation options and risks were discussed with the patient.  All questions were answered and informed consent was obtained.      She denies a personal or family history of anesthesia complications or bleeding disorders.     Allergies   Allergen Reactions     Amlodipine Other (See Comments)     LE edema at the 10 mg dose.  Tolerated the 5 mg dose.     Sulfa Drugs Nausea and Vomiting        Prior to Admission Medications   Prescriptions Last Dose Informant Patient Reported? Taking?   acetaminophen (TYLENOL) 325 MG tablet Past Week at Unknown time  No Yes   Sig: Take 2 tablets (650 mg) by mouth every 4 hours as needed for mild pain   acyclovir (ZOVIRAX) 400 MG tablet More than a month at Unknown time  Yes No   Sig: Take 400 mg by mouth 3 times daily as needed (flare)   hydrochlorothiazide (HYDRODIURIL) 25 MG tablet 2/23/2020 at Unknown time  Yes Yes   Sig: Take 25 mg by mouth daily   ibuprofen (ADVIL/MOTRIN) 400 MG tablet Past Week at Unknown time  No Yes   Sig: Take 1 tablet (400 mg) by mouth every 6 hours as needed for moderate pain   losartan (COZAAR) 50 MG tablet 2/23/2020 at Unknown time  Yes Yes   Sig: Take 50 mg by mouth daily   sertraline (ZOLOFT) 50 MG tablet 2/23/2020 at Unknown time  Yes Yes   Sig: Take 50 mg by mouth daily   traZODone (DESYREL) 50 MG tablet More than a month at Unknown time  Yes No   Sig: Take 50 mg by mouth nightly as needed for sleep     "  Facility-Administered Medications: None       Patient Active Problem List   Diagnosis     Diverticulitis        Past Medical History:   Diagnosis Date     Hypertension      Sleep apnea         Past Surgical History:   Procedure Laterality Date     addenoids       GYN SURGERY       ORTHOPEDIC SURGERY      replaced elbow in 2017     tonsils         Social History     Tobacco Use     Smoking status: Never Smoker     Smokeless tobacco: Never Used   Substance Use Topics     Alcohol use: Yes     Comment: 5-6/week       Family History   Problem Relation Age of Onset     Colon Cancer Paternal Grandmother        REVIEW OF SYSTEMS:     5 point ROS negative except as noted above in HPI, including Gen., Resp., CV, GI &  system review.      PHYSICAL EXAM:   Ht 1.626 m (5' 4\")   Wt 78 kg (172 lb)   BMI 29.52 kg/m   Estimated body mass index is 29.52 kg/m  as calculated from the following:    Height as of this encounter: 1.626 m (5' 4\").    Weight as of this encounter: 78 kg (172 lb).   GENERAL APPEARANCE: healthy and alert  MENTAL STATUS: alert  AIRWAY EXAM: Mallampatti Class II (visualization of the soft palate, fauces, and uvula)  RESP: lungs clear to auscultation - no rales, rhonchi or wheezes  CV: regular rates and rhythm      DIAGNOSTICS:    Not indicated      IMPRESSION   ASA Class 2 - Mild systemic disease        PLAN:       Plan for colonoscopy. We discussed the risks, benefits and alternatives and the patient wished to proceed.    The above has been forwarded to the consulting provider.      Signed Electronically by: Helen Hooks MD, MD  February 24, 2020    "

## 2020-02-25 LAB — COPATH REPORT: NORMAL

## 2020-03-13 ENCOUNTER — HOSPITAL ENCOUNTER (OUTPATIENT)
Dept: CT IMAGING | Facility: CLINIC | Age: 60
Discharge: HOME | End: 2020-03-13
Attending: COLON & RECTAL SURGERY | Admitting: COLON & RECTAL SURGERY
Payer: COMMERCIAL

## 2020-03-13 DIAGNOSIS — K57.20 DIVERTICULITIS OF LARGE INTESTINE WITH PERFORATION: ICD-10-CM

## 2020-03-13 PROCEDURE — 25000128 H RX IP 250 OP 636: Performed by: COLON & RECTAL SURGERY

## 2020-03-13 PROCEDURE — 25000125 ZZHC RX 250: Performed by: COLON & RECTAL SURGERY

## 2020-03-13 PROCEDURE — 74177 CT ABD & PELVIS W/CONTRAST: CPT

## 2020-03-13 RX ORDER — IOPAMIDOL 755 MG/ML
500 INJECTION, SOLUTION INTRAVASCULAR ONCE
Status: COMPLETED | OUTPATIENT
Start: 2020-03-13 | End: 2020-03-13

## 2020-03-13 RX ADMIN — SODIUM CHLORIDE 54 ML: 9 INJECTION, SOLUTION INTRAVENOUS at 13:57

## 2020-03-13 RX ADMIN — IOPAMIDOL 86 ML: 755 INJECTION, SOLUTION INTRAVENOUS at 13:57

## 2020-05-07 DIAGNOSIS — Z11.59 ENCOUNTER FOR SCREENING FOR OTHER VIRAL DISEASES: Primary | ICD-10-CM

## 2020-06-11 ASSESSMENT — MIFFLIN-ST. JEOR: SCORE: 1384.62

## 2020-06-13 DIAGNOSIS — Z11.59 ENCOUNTER FOR SCREENING FOR OTHER VIRAL DISEASES: ICD-10-CM

## 2020-06-13 PROCEDURE — 99207 ZZC NO BILLABLE SERVICE THIS VISIT: CPT

## 2020-06-13 PROCEDURE — 99000 SPECIMEN HANDLING OFFICE-LAB: CPT | Performed by: COLON & RECTAL SURGERY

## 2020-06-13 PROCEDURE — U0003 INFECTIOUS AGENT DETECTION BY NUCLEIC ACID (DNA OR RNA); SEVERE ACUTE RESPIRATORY SYNDROME CORONAVIRUS 2 (SARS-COV-2) (CORONAVIRUS DISEASE [COVID-19]), AMPLIFIED PROBE TECHNIQUE, MAKING USE OF HIGH THROUGHPUT TECHNOLOGIES AS DESCRIBED BY CMS-2020-01-R: HCPCS | Mod: 90 | Performed by: COLON & RECTAL SURGERY

## 2020-06-13 NOTE — PHARMACY-ADMISSION MEDICATION HISTORY
Admission medication history interview status for this patient is complete. See [facility] admission navigator for allergy information, prior to admission medications and immunization status.     PTA meds completed by pre-admitting nurse, Cee Cid RN, and reviewed by pharmacy.        Prior to Admission medications    Medication Sig Last Dose Taking? Auth Provider   acetaminophen (TYLENOL) 325 MG tablet Take 2 tablets (650 mg) by mouth every 4 hours as needed for mild pain  Yes Manuel Miller MD   acyclovir (ZOVIRAX) 400 MG tablet Take 400 mg by mouth 3 times daily as needed (flare)  Yes Unknown, Entered By History   hydrochlorothiazide (HYDRODIURIL) 25 MG tablet Take 25 mg by mouth daily  Yes Unknown, Entered By History   losartan (COZAAR) 50 MG tablet Take 50 mg by mouth daily  Yes Unknown, Entered By History   sertraline (ZOLOFT) 50 MG tablet Take 50 mg by mouth daily  Yes Unknown, Entered By History   traZODone (DESYREL) 50 MG tablet Take 50 mg by mouth nightly as needed for sleep  Yes Unknown, Entered By History   ibuprofen (ADVIL/MOTRIN) 400 MG tablet Take 1 tablet (400 mg) by mouth every 6 hours as needed for moderate pain   Manuel Miller MD

## 2020-06-14 LAB
SARS-COV-2 RNA SPEC QL NAA+PROBE: NOT DETECTED
SPECIMEN SOURCE: NORMAL

## 2020-06-16 ENCOUNTER — ANESTHESIA (OUTPATIENT)
Dept: SURGERY | Facility: CLINIC | Age: 60
DRG: 331 | End: 2020-06-16
Payer: COMMERCIAL

## 2020-06-16 ENCOUNTER — HOSPITAL ENCOUNTER (INPATIENT)
Facility: CLINIC | Age: 60
LOS: 2 days | Discharge: HOME | DRG: 331 | End: 2020-06-18
Attending: COLON & RECTAL SURGERY | Admitting: COLON & RECTAL SURGERY
Payer: COMMERCIAL

## 2020-06-16 ENCOUNTER — ANESTHESIA EVENT (OUTPATIENT)
Dept: SURGERY | Facility: CLINIC | Age: 60
DRG: 331 | End: 2020-06-16
Payer: COMMERCIAL

## 2020-06-16 DIAGNOSIS — K57.32 DIVERTICULITIS OF SIGMOID COLON: Primary | ICD-10-CM

## 2020-06-16 LAB
CREAT SERPL-MCNC: 0.65 MG/DL (ref 0.52–1.04)
GFR SERPL CREATININE-BSD FRML MDRD: >90 ML/MIN/{1.73_M2}
PLATELET # BLD AUTO: 264 10E9/L (ref 150–450)

## 2020-06-16 PROCEDURE — 25000125 ZZHC RX 250: Performed by: NURSE ANESTHETIST, CERTIFIED REGISTERED

## 2020-06-16 PROCEDURE — 37000008 ZZH ANESTHESIA TECHNICAL FEE, 1ST 30 MIN: Performed by: COLON & RECTAL SURGERY

## 2020-06-16 PROCEDURE — 88307 TISSUE EXAM BY PATHOLOGIST: CPT | Performed by: COLON & RECTAL SURGERY

## 2020-06-16 PROCEDURE — 37000009 ZZH ANESTHESIA TECHNICAL FEE, EACH ADDTL 15 MIN: Performed by: COLON & RECTAL SURGERY

## 2020-06-16 PROCEDURE — 0DBN0ZZ EXCISION OF SIGMOID COLON, OPEN APPROACH: ICD-10-PCS | Performed by: COLON & RECTAL SURGERY

## 2020-06-16 PROCEDURE — 36415 COLL VENOUS BLD VENIPUNCTURE: CPT | Performed by: COLON & RECTAL SURGERY

## 2020-06-16 PROCEDURE — 25000128 H RX IP 250 OP 636: Performed by: ANESTHESIOLOGY

## 2020-06-16 PROCEDURE — 40000306 ZZH STATISTIC PRE PROC ASSESS II: Performed by: COLON & RECTAL SURGERY

## 2020-06-16 PROCEDURE — 85049 AUTOMATED PLATELET COUNT: CPT | Performed by: COLON & RECTAL SURGERY

## 2020-06-16 PROCEDURE — 25800025 ZZH RX 258: Performed by: COLON & RECTAL SURGERY

## 2020-06-16 PROCEDURE — 36000093 ZZH SURGERY LEVEL 4 1ST 30 MIN: Performed by: COLON & RECTAL SURGERY

## 2020-06-16 PROCEDURE — 82565 ASSAY OF CREATININE: CPT | Performed by: COLON & RECTAL SURGERY

## 2020-06-16 PROCEDURE — 88307 TISSUE EXAM BY PATHOLOGIST: CPT | Mod: 26 | Performed by: COLON & RECTAL SURGERY

## 2020-06-16 PROCEDURE — 25000125 ZZHC RX 250: Performed by: ANESTHESIOLOGY

## 2020-06-16 PROCEDURE — 25000128 H RX IP 250 OP 636: Performed by: NURSE ANESTHETIST, CERTIFIED REGISTERED

## 2020-06-16 PROCEDURE — 25000128 H RX IP 250 OP 636: Performed by: COLON & RECTAL SURGERY

## 2020-06-16 PROCEDURE — 71000013 ZZH RECOVERY PHASE 1 LEVEL 1 EA ADDTL HR: Performed by: COLON & RECTAL SURGERY

## 2020-06-16 PROCEDURE — 25800030 ZZH RX IP 258 OP 636: Performed by: COLON & RECTAL SURGERY

## 2020-06-16 PROCEDURE — 25000125 ZZHC RX 250: Performed by: COLON & RECTAL SURGERY

## 2020-06-16 PROCEDURE — 12000000 ZZH R&B MED SURG/OB

## 2020-06-16 PROCEDURE — 25800030 ZZH RX IP 258 OP 636: Performed by: ANESTHESIOLOGY

## 2020-06-16 PROCEDURE — 25000132 ZZH RX MED GY IP 250 OP 250 PS 637: Performed by: COLON & RECTAL SURGERY

## 2020-06-16 PROCEDURE — 27210794 ZZH OR GENERAL SUPPLY STERILE: Performed by: COLON & RECTAL SURGERY

## 2020-06-16 PROCEDURE — 71000012 ZZH RECOVERY PHASE 1 LEVEL 1 FIRST HR: Performed by: COLON & RECTAL SURGERY

## 2020-06-16 PROCEDURE — 36000063 ZZH SURGERY LEVEL 4 EA 15 ADDTL MIN: Performed by: COLON & RECTAL SURGERY

## 2020-06-16 RX ORDER — FENTANYL CITRATE 50 UG/ML
25-50 INJECTION, SOLUTION INTRAMUSCULAR; INTRAVENOUS EVERY 5 MIN PRN
Status: DISCONTINUED | OUTPATIENT
Start: 2020-06-16 | End: 2020-06-16 | Stop reason: HOSPADM

## 2020-06-16 RX ORDER — HYDROMORPHONE HYDROCHLORIDE 1 MG/ML
.3-.5 INJECTION, SOLUTION INTRAMUSCULAR; INTRAVENOUS; SUBCUTANEOUS
Status: DISCONTINUED | OUTPATIENT
Start: 2020-06-16 | End: 2020-06-18 | Stop reason: HOSPADM

## 2020-06-16 RX ORDER — SODIUM CHLORIDE, SODIUM LACTATE, POTASSIUM CHLORIDE, CALCIUM CHLORIDE 600; 310; 30; 20 MG/100ML; MG/100ML; MG/100ML; MG/100ML
INJECTION, SOLUTION INTRAVENOUS CONTINUOUS
Status: DISCONTINUED | OUTPATIENT
Start: 2020-06-16 | End: 2020-06-16 | Stop reason: HOSPADM

## 2020-06-16 RX ORDER — CIPROFLOXACIN 2 MG/ML
400 INJECTION, SOLUTION INTRAVENOUS
Status: COMPLETED | OUTPATIENT
Start: 2020-06-16 | End: 2020-06-16

## 2020-06-16 RX ORDER — ONDANSETRON 4 MG/1
4 TABLET, ORALLY DISINTEGRATING ORAL EVERY 30 MIN PRN
Status: DISCONTINUED | OUTPATIENT
Start: 2020-06-16 | End: 2020-06-16 | Stop reason: HOSPADM

## 2020-06-16 RX ORDER — METRONIDAZOLE 500 MG/1
1 TABLET ORAL DAILY
Status: ON HOLD | COMMUNITY
Start: 2020-06-01 | End: 2020-06-18

## 2020-06-16 RX ORDER — FENTANYL CITRATE 50 UG/ML
INJECTION, SOLUTION INTRAMUSCULAR; INTRAVENOUS PRN
Status: DISCONTINUED | OUTPATIENT
Start: 2020-06-16 | End: 2020-06-16

## 2020-06-16 RX ORDER — ONDANSETRON 2 MG/ML
4 INJECTION INTRAMUSCULAR; INTRAVENOUS EVERY 30 MIN PRN
Status: DISCONTINUED | OUTPATIENT
Start: 2020-06-16 | End: 2020-06-16 | Stop reason: HOSPADM

## 2020-06-16 RX ORDER — PROPRANOLOL HYDROCHLORIDE 10 MG/1
10 TABLET ORAL DAILY PRN
Status: DISCONTINUED | OUTPATIENT
Start: 2020-06-16 | End: 2020-06-18 | Stop reason: HOSPADM

## 2020-06-16 RX ORDER — ONDANSETRON 4 MG/1
1 TABLET, ORALLY DISINTEGRATING ORAL PRN
Status: ON HOLD | COMMUNITY
Start: 2020-04-21 | End: 2020-06-18

## 2020-06-16 RX ORDER — LIDOCAINE 40 MG/G
CREAM TOPICAL
Status: DISCONTINUED | OUTPATIENT
Start: 2020-06-16 | End: 2020-06-18 | Stop reason: HOSPADM

## 2020-06-16 RX ORDER — CIPROFLOXACIN 2 MG/ML
400 INJECTION, SOLUTION INTRAVENOUS SEE ADMIN INSTRUCTIONS
Status: DISCONTINUED | OUTPATIENT
Start: 2020-06-16 | End: 2020-06-16 | Stop reason: HOSPADM

## 2020-06-16 RX ORDER — NEOMYCIN SULFATE 500 MG/1
1 TABLET ORAL DAILY PRN
Status: ON HOLD | COMMUNITY
Start: 2020-04-21 | End: 2020-06-18

## 2020-06-16 RX ORDER — HYDROMORPHONE HYDROCHLORIDE 1 MG/ML
.3-.5 INJECTION, SOLUTION INTRAMUSCULAR; INTRAVENOUS; SUBCUTANEOUS EVERY 10 MIN PRN
Status: DISCONTINUED | OUTPATIENT
Start: 2020-06-16 | End: 2020-06-16 | Stop reason: HOSPADM

## 2020-06-16 RX ORDER — HEPARIN SODIUM 5000 [USP'U]/.5ML
5000 INJECTION, SOLUTION INTRAVENOUS; SUBCUTANEOUS
Status: COMPLETED | OUTPATIENT
Start: 2020-06-16 | End: 2020-06-16

## 2020-06-16 RX ORDER — TRAZODONE HYDROCHLORIDE 50 MG/1
50 TABLET, FILM COATED ORAL
Status: DISCONTINUED | OUTPATIENT
Start: 2020-06-17 | End: 2020-06-18 | Stop reason: HOSPADM

## 2020-06-16 RX ORDER — GLYCOPYRROLATE 0.2 MG/ML
INJECTION, SOLUTION INTRAMUSCULAR; INTRAVENOUS PRN
Status: DISCONTINUED | OUTPATIENT
Start: 2020-06-16 | End: 2020-06-16

## 2020-06-16 RX ORDER — CIPROFLOXACIN 500 MG/1
1 TABLET, FILM COATED ORAL DAILY
Status: ON HOLD | COMMUNITY
Start: 2020-06-01 | End: 2020-06-18

## 2020-06-16 RX ORDER — NALOXONE HYDROCHLORIDE 0.4 MG/ML
.1-.4 INJECTION, SOLUTION INTRAMUSCULAR; INTRAVENOUS; SUBCUTANEOUS
Status: DISCONTINUED | OUTPATIENT
Start: 2020-06-16 | End: 2020-06-16 | Stop reason: HOSPADM

## 2020-06-16 RX ORDER — PROPOFOL 10 MG/ML
INJECTION, EMULSION INTRAVENOUS PRN
Status: DISCONTINUED | OUTPATIENT
Start: 2020-06-16 | End: 2020-06-16

## 2020-06-16 RX ORDER — NALOXONE HYDROCHLORIDE 0.4 MG/ML
.1-.4 INJECTION, SOLUTION INTRAMUSCULAR; INTRAVENOUS; SUBCUTANEOUS
Status: DISCONTINUED | OUTPATIENT
Start: 2020-06-16 | End: 2020-06-18 | Stop reason: HOSPADM

## 2020-06-16 RX ORDER — ONDANSETRON 2 MG/ML
INJECTION INTRAMUSCULAR; INTRAVENOUS PRN
Status: DISCONTINUED | OUTPATIENT
Start: 2020-06-16 | End: 2020-06-16

## 2020-06-16 RX ORDER — ONDANSETRON 2 MG/ML
4 INJECTION INTRAMUSCULAR; INTRAVENOUS EVERY 6 HOURS PRN
Status: DISCONTINUED | OUTPATIENT
Start: 2020-06-16 | End: 2020-06-18 | Stop reason: HOSPADM

## 2020-06-16 RX ORDER — PROPOFOL 10 MG/ML
INJECTION, EMULSION INTRAVENOUS CONTINUOUS PRN
Status: DISCONTINUED | OUTPATIENT
Start: 2020-06-16 | End: 2020-06-16

## 2020-06-16 RX ORDER — FENTANYL CITRATE 50 UG/ML
25-50 INJECTION, SOLUTION INTRAMUSCULAR; INTRAVENOUS
Status: CANCELLED | OUTPATIENT
Start: 2020-06-16

## 2020-06-16 RX ORDER — LIDOCAINE 40 MG/G
CREAM TOPICAL
Status: DISCONTINUED | OUTPATIENT
Start: 2020-06-16 | End: 2020-06-16 | Stop reason: HOSPADM

## 2020-06-16 RX ORDER — NEOSTIGMINE METHYLSULFATE 1 MG/ML
VIAL (ML) INJECTION PRN
Status: DISCONTINUED | OUTPATIENT
Start: 2020-06-16 | End: 2020-06-16

## 2020-06-16 RX ORDER — LOSARTAN POTASSIUM 50 MG/1
50 TABLET ORAL DAILY
Status: DISCONTINUED | OUTPATIENT
Start: 2020-06-18 | End: 2020-06-18 | Stop reason: HOSPADM

## 2020-06-16 RX ORDER — DEXAMETHASONE SODIUM PHOSPHATE 4 MG/ML
INJECTION, SOLUTION INTRA-ARTICULAR; INTRALESIONAL; INTRAMUSCULAR; INTRAVENOUS; SOFT TISSUE PRN
Status: DISCONTINUED | OUTPATIENT
Start: 2020-06-16 | End: 2020-06-16

## 2020-06-16 RX ORDER — LIDOCAINE HYDROCHLORIDE 10 MG/ML
INJECTION, SOLUTION INFILTRATION; PERINEURAL PRN
Status: DISCONTINUED | OUTPATIENT
Start: 2020-06-16 | End: 2020-06-16

## 2020-06-16 RX ORDER — ACETAMINOPHEN 325 MG/1
975 TABLET ORAL EVERY 8 HOURS
Status: DISCONTINUED | OUTPATIENT
Start: 2020-06-16 | End: 2020-06-18 | Stop reason: HOSPADM

## 2020-06-16 RX ORDER — ALVIMOPAN 12 MG/1
12 CAPSULE ORAL 2 TIMES DAILY
Status: DISCONTINUED | OUTPATIENT
Start: 2020-06-17 | End: 2020-06-18 | Stop reason: HOSPADM

## 2020-06-16 RX ORDER — BUPIVACAINE HYDROCHLORIDE AND EPINEPHRINE 2.5; 5 MG/ML; UG/ML
INJECTION, SOLUTION EPIDURAL; INFILTRATION; INTRACAUDAL; PERINEURAL PRN
Status: DISCONTINUED | OUTPATIENT
Start: 2020-06-16 | End: 2020-06-16 | Stop reason: HOSPADM

## 2020-06-16 RX ORDER — MEPERIDINE HYDROCHLORIDE 50 MG/ML
12.5 INJECTION INTRAMUSCULAR; INTRAVENOUS; SUBCUTANEOUS
Status: DISCONTINUED | OUTPATIENT
Start: 2020-06-16 | End: 2020-06-16 | Stop reason: HOSPADM

## 2020-06-16 RX ORDER — HYDROCHLOROTHIAZIDE 25 MG/1
25 TABLET ORAL DAILY
Status: DISCONTINUED | OUTPATIENT
Start: 2020-06-18 | End: 2020-06-18 | Stop reason: HOSPADM

## 2020-06-16 RX ORDER — ALVIMOPAN 12 MG/1
12 CAPSULE ORAL ONCE
Status: COMPLETED | OUTPATIENT
Start: 2020-06-16 | End: 2020-06-16

## 2020-06-16 RX ORDER — SCOLOPAMINE TRANSDERMAL SYSTEM 1 MG/1
1 PATCH, EXTENDED RELEASE TRANSDERMAL ONCE
Status: DISCONTINUED | OUTPATIENT
Start: 2020-06-16 | End: 2020-06-18 | Stop reason: HOSPADM

## 2020-06-16 RX ORDER — EPHEDRINE SULFATE 50 MG/ML
INJECTION, SOLUTION INTRAMUSCULAR; INTRAVENOUS; SUBCUTANEOUS PRN
Status: DISCONTINUED | OUTPATIENT
Start: 2020-06-16 | End: 2020-06-16

## 2020-06-16 RX ORDER — ALBUTEROL SULFATE 0.83 MG/ML
2.5 SOLUTION RESPIRATORY (INHALATION) EVERY 4 HOURS PRN
Status: DISCONTINUED | OUTPATIENT
Start: 2020-06-16 | End: 2020-06-16 | Stop reason: HOSPADM

## 2020-06-16 RX ORDER — LIDOCAINE HYDROCHLORIDE 40 MG/ML
SOLUTION TOPICAL PRN
Status: DISCONTINUED | OUTPATIENT
Start: 2020-06-16 | End: 2020-06-16

## 2020-06-16 RX ORDER — PROPRANOLOL HYDROCHLORIDE 10 MG/1
1 TABLET ORAL DAILY PRN
COMMUNITY

## 2020-06-16 RX ORDER — ACETAMINOPHEN 325 MG/1
975 TABLET ORAL ONCE
Status: COMPLETED | OUTPATIENT
Start: 2020-06-16 | End: 2020-06-16

## 2020-06-16 RX ORDER — SODIUM CHLORIDE, SODIUM LACTATE, POTASSIUM CHLORIDE, CALCIUM CHLORIDE 600; 310; 30; 20 MG/100ML; MG/100ML; MG/100ML; MG/100ML
INJECTION, SOLUTION INTRAVENOUS CONTINUOUS
Status: DISCONTINUED | OUTPATIENT
Start: 2020-06-16 | End: 2020-06-18 | Stop reason: HOSPADM

## 2020-06-16 RX ADMIN — ROCURONIUM BROMIDE 50 MG: 10 INJECTION INTRAVENOUS at 14:19

## 2020-06-16 RX ADMIN — HYDROMORPHONE HYDROCHLORIDE 1 MG: 1 INJECTION, SOLUTION INTRAMUSCULAR; INTRAVENOUS; SUBCUTANEOUS at 14:53

## 2020-06-16 RX ADMIN — SODIUM CHLORIDE, POTASSIUM CHLORIDE, SODIUM LACTATE AND CALCIUM CHLORIDE: 600; 310; 30; 20 INJECTION, SOLUTION INTRAVENOUS at 14:00

## 2020-06-16 RX ADMIN — PROPOFOL 100 MCG/KG/MIN: 10 INJECTION, EMULSION INTRAVENOUS at 14:42

## 2020-06-16 RX ADMIN — SCOPALAMINE 1 PATCH: 1 PATCH, EXTENDED RELEASE TRANSDERMAL at 13:23

## 2020-06-16 RX ADMIN — DEXAMETHASONE SODIUM PHOSPHATE 4 MG: 4 INJECTION, SOLUTION INTRA-ARTICULAR; INTRALESIONAL; INTRAMUSCULAR; INTRAVENOUS; SOFT TISSUE at 14:18

## 2020-06-16 RX ADMIN — ONDANSETRON HYDROCHLORIDE 4 MG: 2 INJECTION, SOLUTION INTRAVENOUS at 16:42

## 2020-06-16 RX ADMIN — SODIUM CHLORIDE, POTASSIUM CHLORIDE, SODIUM LACTATE AND CALCIUM CHLORIDE: 600; 310; 30; 20 INJECTION, SOLUTION INTRAVENOUS at 19:42

## 2020-06-16 RX ADMIN — ROCURONIUM BROMIDE 20 MG: 10 INJECTION INTRAVENOUS at 15:39

## 2020-06-16 RX ADMIN — HEPARIN SODIUM 5000 UNITS: 10000 INJECTION, SOLUTION INTRAVENOUS; SUBCUTANEOUS at 13:11

## 2020-06-16 RX ADMIN — ROCURONIUM BROMIDE 10 MG: 10 INJECTION INTRAVENOUS at 16:12

## 2020-06-16 RX ADMIN — HYDROMORPHONE HYDROCHLORIDE 0.5 MG: 1 INJECTION, SOLUTION INTRAMUSCULAR; INTRAVENOUS; SUBCUTANEOUS at 15:51

## 2020-06-16 RX ADMIN — ACETAMINOPHEN 975 MG: 325 TABLET, FILM COATED ORAL at 12:28

## 2020-06-16 RX ADMIN — FENTANYL CITRATE 100 MCG: 50 INJECTION, SOLUTION INTRAMUSCULAR; INTRAVENOUS at 14:47

## 2020-06-16 RX ADMIN — LIDOCAINE HYDROCHLORIDE 4 ML: 40 SOLUTION TOPICAL at 14:23

## 2020-06-16 RX ADMIN — MIDAZOLAM 2 MG: 1 INJECTION INTRAMUSCULAR; INTRAVENOUS at 14:04

## 2020-06-16 RX ADMIN — CIPROFLOXACIN 400 MG: 2 INJECTION, SOLUTION INTRAVENOUS at 14:26

## 2020-06-16 RX ADMIN — FENTANYL CITRATE 50 MCG: 50 INJECTION, SOLUTION INTRAMUSCULAR; INTRAVENOUS at 18:22

## 2020-06-16 RX ADMIN — PROPOFOL 120 MG: 10 INJECTION, EMULSION INTRAVENOUS at 14:18

## 2020-06-16 RX ADMIN — FENTANYL CITRATE 150 MCG: 50 INJECTION, SOLUTION INTRAMUSCULAR; INTRAVENOUS at 14:18

## 2020-06-16 RX ADMIN — METRONIDAZOLE 500 MG: 500 INJECTION, SOLUTION INTRAVENOUS at 13:54

## 2020-06-16 RX ADMIN — Medication 10 MG: at 16:32

## 2020-06-16 RX ADMIN — ACETAMINOPHEN 975 MG: 325 TABLET, FILM COATED ORAL at 19:43

## 2020-06-16 RX ADMIN — Medication 3 MG: at 16:59

## 2020-06-16 RX ADMIN — ROCURONIUM BROMIDE 10 MG: 10 INJECTION INTRAVENOUS at 14:53

## 2020-06-16 RX ADMIN — SODIUM CHLORIDE, POTASSIUM CHLORIDE, SODIUM LACTATE AND CALCIUM CHLORIDE: 600; 310; 30; 20 INJECTION, SOLUTION INTRAVENOUS at 15:24

## 2020-06-16 RX ADMIN — ALVIMOPAN 12 MG: 12 CAPSULE ORAL at 12:28

## 2020-06-16 RX ADMIN — Medication 10 MG: at 14:28

## 2020-06-16 RX ADMIN — GLYCOPYRROLATE 0.4 MG: 0.2 INJECTION, SOLUTION INTRAMUSCULAR; INTRAVENOUS at 16:59

## 2020-06-16 RX ADMIN — LIDOCAINE HYDROCHLORIDE 50 MG: 10 INJECTION, SOLUTION INFILTRATION; PERINEURAL at 14:18

## 2020-06-16 ASSESSMENT — ACTIVITIES OF DAILY LIVING (ADL)
TOILETING: 0-->INDEPENDENT
ADLS_ACUITY_SCORE: 10
FALL_HISTORY_WITHIN_LAST_SIX_MONTHS: NO
BATHING: 0-->INDEPENDENT
SWALLOWING: 0-->SWALLOWS FOODS/LIQUIDS WITHOUT DIFFICULTY
AMBULATION: 0-->INDEPENDENT
PRIOR_FUNCTIONAL_LEVEL_COMMENT: INDEPENDENT
RETIRED_COMMUNICATION: 0-->UNDERSTANDS/COMMUNICATES WITHOUT DIFFICULTY
DRESS: 0-->INDEPENDENT
RETIRED_EATING: 0-->INDEPENDENT
COGNITION: 0 - NO COGNITION ISSUES REPORTED
TRANSFERRING: 0-->INDEPENDENT

## 2020-06-16 NOTE — OP NOTE
Colon & Rectal Surgery Brief Operative Note      Pre-operative diagnosis: Diverticulitis of large intestine with perforation without bleeding [K57.20]   Post-operative diagnosis: * No post-op diagnosis entered *   Procedure: Procedure(s):  LAPAROSCOPIC ASSISTED SIGMOID COLECTOMY   Surgeon:    Assistant(s): Clemente Garcias   Anesthesia: General   Estimated blood loss: * No blood loss amount entered *   Drains  Disposition:  Findings  Specimens: none  floor  See dictated operative report for full details.  ID Type Source Tests Collected by Time Destination   A : SIGMOID COLON & RINGS Tissue Large Intestine, Sigmoid SURGICAL PATHOLOGY EXAM Helen Hooks MD 6/16/2020  4:56 PM             Condition on discharge from OR: Satisfactory    Clemente Garcias MD   [facility]   [phone].        ADDENDUM:    PATIENT DATA  Indicate Y or N:  Home O2 No  Hemodialysis  No  Transplant patient  No  Cirrhosis  No  Steroids in last 30 days  No  Immunomodulators in last 30 days  No  Anticoagulation at time of surgery  No   List medication   Prior abdominal surgery  Yes  Pelvic irradiation  No    Albumin within 30 days if known    Hgb within 30 days if known    Hemoglobin   Date Value Ref Range Status   01/04/2020 12.2 11.7 - 15.7 g/dL Final   ]  Cr within 30 days if known    Creatinine   Date Value Ref Range Status   01/03/2020 0.59 0.52 - 1.04 mg/dL Final   ]  Body mass index is 31.58 kg/m .      OR DATA  Emergent  No   <24 hours  No   <1 week  No  Bowel Prep Yes  Antibiotics  Yes  DVT prophylaxis    Heparin  Yes   SCD  Yes   None  No  Drain  No  ASA (1,2,3,4) 2  OR time (min) 147  Stents  No  Transfuse >/= 2U  No  Anastomosis   Stapled  Yes   Handsewn  No  Leak Test    Positive  No   Negative  Yes   Not done  No

## 2020-06-16 NOTE — ANESTHESIA PREPROCEDURE EVALUATION
Anesthesia Pre-Procedure Evaluation    Patient: Juli Mullen   MRN: [id number] : 1960          Preoperative Diagnosis: Diverticulitis of large intestine with perforation without bleeding [K57.20]    Procedure(s):  Laparoscopic assisted sigmoid colectomy    Past Medical History:   Diagnosis Date     Diverticulitis large intestine      Gastroesophageal reflux disease      Hypertension      PONV (postoperative nausea and vomiting)      Sleep apnea     doesn't use cpap     Past Surgical History:   Procedure Laterality Date     addenoids       COLONOSCOPY N/A 2020    Procedure: COLONOSCOPY, WITH BIOPSY random biopsies using cold forcep;  Surgeon: Helen Hooks MD;  Location:  GI     GYN SURGERY           HAND SURGERY Left     tendon repair     ORTHOPEDIC SURGERY      replaced elbow in 2017     tonsils       Anesthesia Evaluation     . Pt has had prior anesthetic.     History of anesthetic complications   - PONV        ROS/MED HX    ENT/Pulmonary:     (+)sleep apnea, doesn't use CPAP , . .    Neurologic:  - neg neurologic ROS    (-) Neuropathy   Cardiovascular:     (+) hypertension----. : . . . :. .       METS/Exercise Tolerance:     Hematologic:  - neg hematologic  ROS       Musculoskeletal:  - neg musculoskeletal ROS       GI/Hepatic:     (+) GERD Asymptomatic on medication, Other GI/Hepatic Diverticulitis       Renal/Genitourinary:  - ROS Renal section negative       Endo:  - neg endo ROS       Psychiatric:  - neg psychiatric ROS       Infectious Disease:  - neg infectious disease ROS       Malignancy:         Other:                          Physical Exam  Normal systems: cardiovascular, pulmonary and dental    Airway   Mallampati: II  TM distance: >3 FB  Neck ROM: full    Dental     Cardiovascular       Pulmonary             Lab Results   Component Value Date    WBC 7.9 2020    HGB 12.2 2020    HCT 38.1 2020     2020     2020    POTASSIUM 3.8  "01/03/2020    CHLORIDE 105 01/03/2020    CO2 26 01/03/2020    BUN 13 01/03/2020    CR 0.59 01/03/2020    GLC 99 01/03/2020    DOTTIE 8.9 01/03/2020    ALBUMIN 3.4 01/03/2020    PROTTOTAL 7.5 01/03/2020    ALT 31 01/03/2020    AST 22 01/03/2020    ALKPHOS 72 01/03/2020    BILITOTAL 0.6 01/03/2020    LIPASE 323 01/03/2020       Preop Vitals  BP Readings from Last 3 Encounters:   06/16/20 (!) 153/93   02/24/20 132/76   01/04/20 113/66    Pulse Readings from Last 3 Encounters:   06/16/20 68   02/24/20 53   01/04/20 54      Resp Readings from Last 3 Encounters:   06/16/20 20   02/24/20 16   01/04/20 16    SpO2 Readings from Last 3 Encounters:   06/16/20 98%   02/24/20 94%   01/04/20 96%      Temp Readings from Last 1 Encounters:   06/16/20 97.2  F (36.2  C) (Temporal)    Ht Readings from Last 1 Encounters:   06/11/20 1.626 m (5' 4\")      Wt Readings from Last 1 Encounters:   06/11/20 83.5 kg (184 lb)    Estimated body mass index is 31.58 kg/m  as calculated from the following:    Height as of this encounter: 1.626 m (5' 4\").    Weight as of this encounter: 83.5 kg (184 lb).       Anesthesia Plan      History & Physical Review  History and physical reviewed and following examination; no interval change.    ASA Status:  2 .    NPO Status:  > 8 hours    Plan for General with Intravenous induction. Maintenance will be Balanced.    PONV prophylaxis:  Ondansetron (or other 5HT-3), Dexamethasone or Solumedrol and Scopolamine patch         Postoperative Care  Postoperative pain management:  IV analgesics.      Consents  Anesthetic plan, risks, benefits and alternatives discussed with:  Patient..                 Wilson Gee MD                    .  "

## 2020-06-16 NOTE — PROGRESS NOTES
"SPIRITUAL HEALTH SERVICES Progress Note  [facility] Pre-surgical    SH consult per pt request.  Met with pt, Juli, who shares she is [place] and that \"my surgery has been delayed.\" She goes on to share stories of her family, her work, and her hopes for surgery. She then invites prayer and we shared in prayer together. No other needs expressed at this time. SH remains available.     GERALDINE Woodward.  Staff    Pager #[phone]   Pronouns: he/him/his    "

## 2020-06-16 NOTE — ANESTHESIA CARE TRANSFER NOTE
Patient: Juli Mullen    Procedure(s):  LAPAROSCOPIC ASSISTED SIGMOID COLECTOMY    Diagnosis: Diverticulitis of large intestine with perforation without bleeding [K57.20]  Diagnosis Additional Information: No value filed.    Anesthesia Type:   General     Note:  Airway :T-piece  Patient transferred to:PACU  Handoff Report: Identifed the Patient, Identified the Reponsible Provider, Reviewed the pertinent medical history, Discussed the surgical course, Reviewed Intra-OP anesthesia mangement and issues during anesthesia, Set expectations for post-procedure period and Allowed opportunity for questions and acknowledgement of understanding      Vitals: (Last set prior to Anesthesia Care Transfer)    CRNA VITALS  6/16/2020 1649 - 6/16/2020 1727      6/16/2020             Pulse:  63    SpO2:  95 %                Electronically Signed By: MESSI Lopez CRNA  June 16, 2020  5:27 PM

## 2020-06-17 LAB
ANION GAP SERPL CALCULATED.3IONS-SCNC: 6 MMOL/L (ref 3–14)
BUN SERPL-MCNC: 8 MG/DL (ref 7–30)
CALCIUM SERPL-MCNC: 8.5 MG/DL (ref 8.5–10.1)
CHLORIDE SERPL-SCNC: 102 MMOL/L (ref 94–109)
CO2 SERPL-SCNC: 30 MMOL/L (ref 20–32)
CREAT SERPL-MCNC: 0.61 MG/DL (ref 0.52–1.04)
ERYTHROCYTE [DISTWIDTH] IN BLOOD BY AUTOMATED COUNT: 12.6 % (ref 10–15)
GFR SERPL CREATININE-BSD FRML MDRD: >90 ML/MIN/{1.73_M2}
GLUCOSE SERPL-MCNC: 109 MG/DL (ref 70–99)
HCT VFR BLD AUTO: 41.4 % (ref 35–47)
HGB BLD-MCNC: 13.4 G/DL (ref 11.7–15.7)
MCH RBC QN AUTO: 31.6 PG (ref 26.5–33)
MCHC RBC AUTO-ENTMCNC: 32.4 G/DL (ref 31.5–36.5)
MCV RBC AUTO: 98 FL (ref 78–100)
PLATELET # BLD AUTO: 280 10E9/L (ref 150–450)
POTASSIUM SERPL-SCNC: 3.8 MMOL/L (ref 3.4–5.3)
RBC # BLD AUTO: 4.24 10E12/L (ref 3.8–5.2)
SODIUM SERPL-SCNC: 138 MMOL/L (ref 133–144)
WBC # BLD AUTO: 9.3 10E9/L (ref 4–11)

## 2020-06-17 PROCEDURE — 12000000 ZZH R&B MED SURG/OB

## 2020-06-17 PROCEDURE — 85027 COMPLETE CBC AUTOMATED: CPT | Performed by: COLON & RECTAL SURGERY

## 2020-06-17 PROCEDURE — 25000132 ZZH RX MED GY IP 250 OP 250 PS 637: Performed by: COLON & RECTAL SURGERY

## 2020-06-17 PROCEDURE — 25000132 ZZH RX MED GY IP 250 OP 250 PS 637: Performed by: PHYSICIAN ASSISTANT

## 2020-06-17 PROCEDURE — 36415 COLL VENOUS BLD VENIPUNCTURE: CPT | Performed by: COLON & RECTAL SURGERY

## 2020-06-17 PROCEDURE — 80048 BASIC METABOLIC PNL TOTAL CA: CPT | Performed by: COLON & RECTAL SURGERY

## 2020-06-17 PROCEDURE — 25000128 H RX IP 250 OP 636: Performed by: COLON & RECTAL SURGERY

## 2020-06-17 RX ORDER — OXYCODONE HYDROCHLORIDE 5 MG/1
5 TABLET ORAL EVERY 4 HOURS PRN
Status: DISCONTINUED | OUTPATIENT
Start: 2020-06-17 | End: 2020-06-18 | Stop reason: HOSPADM

## 2020-06-17 RX ADMIN — ACETAMINOPHEN 975 MG: 325 TABLET, FILM COATED ORAL at 12:29

## 2020-06-17 RX ADMIN — SERTRALINE HYDROCHLORIDE 50 MG: 50 TABLET ORAL at 09:45

## 2020-06-17 RX ADMIN — OXYCODONE HYDROCHLORIDE 5 MG: 5 TABLET ORAL at 18:49

## 2020-06-17 RX ADMIN — HYDROMORPHONE HYDROCHLORIDE 0.5 MG: 1 INJECTION, SOLUTION INTRAMUSCULAR; INTRAVENOUS; SUBCUTANEOUS at 04:01

## 2020-06-17 RX ADMIN — ACETAMINOPHEN 975 MG: 325 TABLET, FILM COATED ORAL at 19:57

## 2020-06-17 RX ADMIN — ACETAMINOPHEN 975 MG: 325 TABLET, FILM COATED ORAL at 04:01

## 2020-06-17 RX ADMIN — ALVIMOPAN 12 MG: 12 CAPSULE ORAL at 22:39

## 2020-06-17 RX ADMIN — OXYCODONE HYDROCHLORIDE 5 MG: 5 TABLET ORAL at 12:28

## 2020-06-17 RX ADMIN — HYDROMORPHONE HYDROCHLORIDE 0.5 MG: 1 INJECTION, SOLUTION INTRAMUSCULAR; INTRAVENOUS; SUBCUTANEOUS at 09:45

## 2020-06-17 RX ADMIN — ALVIMOPAN 12 MG: 12 CAPSULE ORAL at 10:50

## 2020-06-17 RX ADMIN — ENOXAPARIN SODIUM 40 MG: 40 INJECTION SUBCUTANEOUS at 10:49

## 2020-06-17 ASSESSMENT — ACTIVITIES OF DAILY LIVING (ADL)
ADLS_ACUITY_SCORE: 12

## 2020-06-17 NOTE — PLAN OF CARE
To Do:  End of Shift Summary  For vital signs and complete assessments, please see documentation flowsheets.     Pertinent assessments: VSS. 2LNC. IV dilaudid/scheduled tylenol/ice for pain. Denies nausea. Tolerating clears. No BS/gas. Lap sites/incisions CDI. Adequate urine output from collado. Capno in place  Major Shift Events:   Treatment Plan: Pain control, IVF, return of bowel function, Entereg, lovenox    Discharge Readiness: Medically active  Expected Discharge Date: TBD  Discharge Disposition: Home with Self care  Barriers/Criteria for discharge TBD

## 2020-06-17 NOTE — PROGRESS NOTES
[facility]  PROGRESS NOTE    June 17, 2020  Post-op Day # 1    SUBJECTIVE:  Patient reports her pain is controlled.  She is tolerating clear liquids without nausea or vomiting.  She has passed some gas with a very small amount of stool and a little blood.  Up walking around the room twice so far.    OBJECTIVE:  Temp:  [96.2  F (35.7  C)-98.8  F (37.1  C)] 96.2  F (35.7  C)  Pulse:  [62-68] 64  Heart Rate:  [52-69] 52  Resp:  [7-22] 18  BP: (119-153)/(70-93) 135/72  SpO2:  [93 %-100 %] 97 %    Intake/Output Summary (Last 24 hours) at 6/17/2020 0840  Last data filed at 6/17/2020 0813  Gross per 24 hour   Intake 3656.25 ml   Output 2885 ml   Net 771.25 ml       GENERAL:  Awake, alert, no acute distress, sitting up in bed  HEAD: Nomocephalic atraumatic  SCLERA: anicteric  EXTREMITIES: warm and well perfused  ABDOMEN:  Soft, appropriately tender, non-distended, no rebound or guarding, no peritoneal signs.  INCISION:  C/d/i, no sign of infection    LABS:  Lab Results   Component Value Date    WBC 9.3 06/17/2020     Lab Results   Component Value Date    HGB 13.4 06/17/2020     Lab Results   Component Value Date    HCT 41.4 06/17/2020     Lab Results   Component Value Date     06/17/2020     Last Basic Metabolic Panel:  Lab Results   Component Value Date     06/17/2020      Lab Results   Component Value Date    POTASSIUM 3.8 06/17/2020     Lab Results   Component Value Date    CHLORIDE 102 06/17/2020     Lab Results   Component Value Date    DOTTIE 8.5 06/17/2020     Lab Results   Component Value Date    CO2 30 06/17/2020     Lab Results   Component Value Date    BUN 8 06/17/2020     Lab Results   Component Value Date    CR 0.61 06/17/2020     Lab Results   Component Value Date     06/17/2020       ASSESSMENT/PLAN: POD#1 lap sigmoid colectomy.  Afebrile, VSS.    - Advance to full liquid diet  - Pain management as needed  - Saline lock IV  - Discontinue collado catheter  - Encourage ambulation  -  Lovenox for DVT ppx      Disposition: Expected discharge in 1-2 days.  Barriers to discharge: Tolerating low fiber diet, pain controlled with oral meds, return of bowel function.    For questions/paging, please contact the CRS office at [phone].    Gisele Cole PA-C  [facility]  Phone: [phone]    [facility] Attending Note    Patient seen and examined independently.  Agree with above assessment and plan.  Feeling well. Tolerating clears.  abd soft, incision with slight bruisng.  Plan  Fulls  Oral pain meds  Likely discharge tomorrow.     Helen Hooks MD  [facility]  Office Phone # [phone]

## 2020-06-17 NOTE — ANESTHESIA POSTPROCEDURE EVALUATION
Patient: Juli Mullen    Procedure(s):  LAPAROSCOPIC ASSISTED SIGMOID COLECTOMY    Diagnosis:Diverticulitis of large intestine with perforation without bleeding [K57.20]  Diagnosis Additional Information: No value filed.    Anesthesia Type:  General    Note:  Anesthesia Post Evaluation    Patient location during evaluation: PACU  Patient participation: Able to fully participate in evaluation  Level of consciousness: awake  Pain management: adequate  Airway patency: patent  Cardiovascular status: acceptable  Respiratory status: acceptable  Hydration status: euvolemic  PONV: controlled     Anesthetic complications: None          Last vitals:  Vitals:    06/17/20 0001 06/17/20 0345 06/17/20 0401   BP: 124/74 119/72    Pulse:      Resp: 18 18 18   Temp: 98.1  F (36.7  C)     SpO2: 93% 97%          Electronically Signed By: Daniel George MD  June 17, 2020  5:13 AM

## 2020-06-17 NOTE — PLAN OF CARE
Pt up ind. VSS BS faint. Some flatus. Had small bloody BM this am. Incisions with derma bond. Ice pack applied. Switched from IV dilaudid to oxycodone. Gave abd binder. Yadiel full liquids, then advanced to low fiber, tolerated, denies nausea. Henriquez d/c'd, voided. Amb in halls.

## 2020-06-18 VITALS
DIASTOLIC BLOOD PRESSURE: 72 MMHG | HEIGHT: 64 IN | TEMPERATURE: 98.5 F | OXYGEN SATURATION: 95 % | SYSTOLIC BLOOD PRESSURE: 140 MMHG | RESPIRATION RATE: 16 BRPM | BODY MASS INDEX: 31.41 KG/M2 | WEIGHT: 184 LBS | HEART RATE: 64 BPM

## 2020-06-18 LAB
COPATH REPORT: NORMAL
GLUCOSE BLDC GLUCOMTR-MCNC: 103 MG/DL (ref 70–99)

## 2020-06-18 PROCEDURE — 25000132 ZZH RX MED GY IP 250 OP 250 PS 637: Performed by: COLON & RECTAL SURGERY

## 2020-06-18 PROCEDURE — 25000128 H RX IP 250 OP 636: Performed by: COLON & RECTAL SURGERY

## 2020-06-18 PROCEDURE — 25000132 ZZH RX MED GY IP 250 OP 250 PS 637: Performed by: PHYSICIAN ASSISTANT

## 2020-06-18 PROCEDURE — 00000146 ZZHCL STATISTIC GLUCOSE BY METER IP

## 2020-06-18 RX ORDER — OXYCODONE HYDROCHLORIDE 5 MG/1
5 TABLET ORAL EVERY 4 HOURS PRN
Qty: 6 TABLET | Refills: 0 | Status: SHIPPED | OUTPATIENT
Start: 2020-06-18

## 2020-06-18 RX ADMIN — ONDANSETRON 4 MG: 2 INJECTION INTRAMUSCULAR; INTRAVENOUS at 04:33

## 2020-06-18 RX ADMIN — ACETAMINOPHEN 975 MG: 325 TABLET, FILM COATED ORAL at 12:40

## 2020-06-18 RX ADMIN — LOSARTAN POTASSIUM 50 MG: 50 TABLET, FILM COATED ORAL at 08:54

## 2020-06-18 RX ADMIN — SERTRALINE HYDROCHLORIDE 50 MG: 50 TABLET ORAL at 08:54

## 2020-06-18 RX ADMIN — ACETAMINOPHEN 975 MG: 325 TABLET, FILM COATED ORAL at 04:26

## 2020-06-18 RX ADMIN — OXYCODONE HYDROCHLORIDE 5 MG: 5 TABLET ORAL at 08:55

## 2020-06-18 RX ADMIN — HYDROCHLOROTHIAZIDE 25 MG: 25 TABLET ORAL at 08:54

## 2020-06-18 RX ADMIN — OXYCODONE HYDROCHLORIDE 5 MG: 5 TABLET ORAL at 00:47

## 2020-06-18 RX ADMIN — ENOXAPARIN SODIUM 40 MG: 40 INJECTION SUBCUTANEOUS at 12:40

## 2020-06-18 ASSESSMENT — ACTIVITIES OF DAILY LIVING (ADL)
ADLS_ACUITY_SCORE: 12

## 2020-06-18 NOTE — PROGRESS NOTES
"[facility] Progress Note             Interval History:   Postop Day #: 2  Slight nausea last night. Tolerating fulls                Medications:   I have reviewed this patient's current medications               Physical Exam:   Blood pressure 127/64, pulse 64, temperature 98.1  F (36.7  C), temperature source Oral, resp. rate 18, height 1.626 m (5' 4\"), weight 83.5 kg (184 lb), SpO2 93 %.    Intake/Output Summary (Last 24 hours) at 6/18/2020 0637  Last data filed at 6/17/2020 1930  Gross per 24 hour   Intake 2874 ml   Output 1250 ml   Net 1624 ml     GEN:  alert  ABD:  Soft, slight bruising.         Data:        Lab Results   Component Value Date     06/17/2020    Lab Results   Component Value Date    CHLORIDE 102 06/17/2020    Lab Results   Component Value Date    BUN 8 06/17/2020      Lab Results   Component Value Date    POTASSIUM 3.8 06/17/2020    Lab Results   Component Value Date    CO2 30 06/17/2020    Lab Results   Component Value Date    CR 0.61 06/17/2020    CR 0.65 06/16/2020        Lab Results   Component Value Date    HGB 13.4 06/17/2020    HGB 12.2 01/04/2020     Lab Results   Component Value Date     06/17/2020     06/16/2020     Lab Results   Component Value Date    WBC 9.3 06/17/2020    WBC 7.9 01/04/2020            Assessment and Plan:   Discharge later today if continues to do well  Low residue diet       Helen Hooks MD  [facility]  Office Phone # [phone]    "

## 2020-06-18 NOTE — OP NOTE
Procedure Date: 06/17/2020      PREOPERATIVE DIAGNOSIS:  Recurrent complicated diverticulitis with prior abscess.      POSTOPERATIVE DIAGNOSIS:  Recurrent complicated diverticulitis with prior abscess.      PROCEDURE:  Laparoscopic-assisted sigmoid resection with primary coloproctostomy at 14 cm from the anal verge.      ANESTHESIA:  General endotracheal anesthesia plus a TAP block done laparoscopically with 30 mL of 0.25% Marcaine with 1:200,000 epinephrine.      INDICATIONS:  Juli is a 59-year-old woman who had recurrent diverticulitis.  Most recently in January, she was found to have a pericolonic abscess that was not amenable to drainage.  She was treated with several courses of antibiotics and had a resolution of her symptoms.  Given this complicated course, she wished to have a resection.  She was briefed on the risks of bleeding, infection, both superficial and deep, possible anastomotic leak that might require reoperation or stoma, and other risks associated with major abdominal surgery and general anesthesia including, but not limited to, DVT, PE, heart attack, stroke, urinary tract infection, other cardiopulmonary events and even death.  She understood and wished to proceed.      FINDINGS:  There was a very thickened sigmoid colon that appeared to have inflammatory changes of the mesentery.  She had a healthy rectum and the descending colon also appeared healthy.      DESCRIPTION OF PROCEDURE:  After informed consent was obtained, the patient was taken to the operating room, positioned on the operating table in the supine position.  She was intubated.  Henriquez catheter and orogastric tube were placed.  Her abdomen was shaved, prepped and draped in the usual fashion.  After appropriate timeout, we began by making a 1 cm incision just below the umbilicus in a vertical fashion.  Dissection was carried down through the fascia, the peritoneal cavity was entered without difficulty.  An 0 Vicryl suture was placed at  the level of fascia and a balloon Bigg was inserted.  The abdomen was insufflated to a pressure of 15 and inspection revealed the sigmoid colon as mentioned above, but no other intraabdominal pathology was noted.  We then performed a TAP block injecting 7.5 mL into each of the 4 quadrants.  Two 5 mm ports were placed on the right lateral abdomen and one in the left lower quadrant.  Initial assessment of the sigmoid colon found it to be quite tethered to the left pelvic sidewall, precluding a medial to lateral approach.  For that reason, we then elected to work on the lateral approach and mobilized the full splenic flexure.      We gently retracted the sigmoid and descending colon medially and incised the line of Toldt, again fully mobilizing the sigmoid colon up to and around the splenic flexure, entering into the lesser sac from lateral approach and allowing comfortable mobilization.  We then worked our way back down and could identify the ureter easily in the retroperitoneal plane and this allowed us to divide the attachments and the quite thickened inflammatory rind on the pelvic sidewall.  This was done with a combination of electrocautery and the LigaSure.  Once we were able to do this, we were able to straighten out the sigmoid colon more easily, although we did have to score the right side of the peritoneum as well.  After doing so, we were able to identify the upper rectum where we intended to divide, noted by where the tinea splayed out on.  This is somewhat tedious as the sigmoid colon was very bulky, so we made a window where we intended to divide proximally first and divided the mesentery down and connected to where we had made the small hole in the mesentery below where we intended to divide distally.  In doing so, we were then able to bring the stapling device in through the 11 mm Bigg port at the umbilicus using a 5/30 scope in the right lower quadrant.  The stapler was fired, and there was a very  small amount of additional tissue, but I elected to use a second fire across there.  The specimen was then folded up into the upper abdomen.  We inspected the staple line, which was nicely hemostatic.  We assessed for reach where we intended to divide and this was a nice length.  We then opened a roughly 4 cm Pfannenstiel incision, incising the skin and fascia transversely, raising flaps above the muscle and then spreading the muscle in the midline and entering into the peritoneal cavity without difficulty.  A 5-9 Gigi was then placed into the abdomen and the specimen was easily delivered.  We could see where we intended to divide and placed a Tacho and Kocher across here.  The specimen was then divided and passed off the field.  It was opened and there was no evidence of luminal abnormality other than diverticular disease.      A 2-0 Prolene pursestring was placed at the cut end of the bowel and secured in place.  This was nice soft compliant healthy bowel that had a small amount of bleeding as expected, indicating good blood flow.  The anvil was then dropped back into the abdomen with the bowel and the Gigi was used to occlude the aperture.  We then reinsufflated the abdomen and could see that the anvil laid nicely in the upper pelvis.  The sizer was then brought up and appeared to be about 14 cm to the staple line.  The stapling device was then advanced and deployed slightly anterior to the staple line.  The anvil was secured after ensuring proper orientation and that no additional tissue was in place and this was brought down comfortably without difficulty.  The stapling device was fired and 2 intact anastomotic rings were retrieved.  The bowel was occluded proximal to the anastomosis and the pelvis was filled with saline.  A leak test was performed and negative.  I was able to visualize the anastomosis to be widely patent, well perfused, tension free and hemostatic at about 14 cm from the anal verge.       I then scrubbed back in and we removed the 5 mm ports under direct vision and Gigi.  We reapproximated the fascia at the infraumbilical incision using the previously placed 0 Vicryl and this was nicely opposed.  We then closed the peritoneum using 2-0 Vicryl suture and reapproximated the fascia using 0 looped PDS.  The wounds were irrigated and the skin edges of the three 5 mm ports and the extraction site were reapproximated with 4-0 Monocryl and Exofin as well as the infraumbilical incision.      ESTIMATED BLOOD LOSS:  10 mL      SPECIMENS:  Sigmoid colon and anastomotic rings.      COMPLICATIONS:  No immediate complications.         LUKASZ AYALA MD             D: 2020   T: 2020   MT:       Name:     WARREN DRAKE   MRN:      [id number]        Account:        [id number]   :      1960           Procedure Date: 2020      Document: [id number]

## 2020-06-18 NOTE — CONSULTS
"CLINICAL NUTRITION SERVICES  -  BRIEF ASSESSMENT/EDUCATION NOTE      MALNUTRITION:  % Weight Loss:  Weight loss does not meet criteria for malnutrition   % Intake:  Decreased intake does not meet criteria for malnutrition   Subcutaneous Fat Loss:  Unable to complete physical exam  Muscle Loss: Unable to complete physical exam  Fluid Retention: None documented    Malnutrition Diagnosis:   Unable to determine due to lack of physical exam (per direction of department guidelines in the setting of [place] [id number])          REASON FOR ASSESSMENT  Juli Mullen is a 59 year old female seen by Registered Dietitian for Provider Order - Nutrition Education - low fiber diet.    PMH of: Recurrent diverticulitis with prior abscess.    Admit 2/2: Planned lap sigmoid colectomy 6/16/2020.     NUTRITION HISTORY  - Information obtained from chart.  Attempted to obtain history via phone (per direction of department guidelines in the setting of [place] [address]) though busy signal on multiple attempts.   - Allergies: NKFA.      CURRENT NUTRITION ORDERS  Diet Order:     Low fiber    Current Intake/Tolerance:  Diet advanced to clears 6/16, fulls yesterday and low fiber this AM.        NUTRITION FOCUSED PHYSICAL ASSESSMENT FOR DIAGNOSING MALNUTRITION)  No:  per direction of department guidelines [facility]            Observed:    deferred d/t above    Obtained from Chart/Interdisciplinary Team:  - No documentation of skin issues   - Stooling patterns reviewed, last recorded BM yesterday    ANTHROPOMETRICS  Height: 5' 4\"[per pt[  Weight: 184 lbs 0 oz  Body mass index is 31.58 kg/m .  Weight Status:  Obesity Grade I BMI 30-34.9  Weight History:  Wt Readings from Last 10 Encounters:   06/11/20 83.5 kg (184 lb)   02/24/20 78 kg (172 lb)   01/03/20 79.7 kg (175 lb 12.8 oz)   01/27/18 77.1 kg (170 lb)   - Wt of 187# from 6/01/2020.  Possibility for fluid shifts post-op, inaccurate or reported wt vs 2% wt loss.      LABS  Labs " reviewed:  Electrolytes  Potassium (mmol/L)   Date Value   06/17/2020 3.8   01/03/2020 3.8    Blood Glucose  Glucose (mg/dL)   Date Value   06/17/2020 109 (H)   01/03/2020 99    Inflammatory Markers  WBC (10e9/L)   Date Value   06/17/2020 9.3   01/04/2020 7.9   01/03/2020 8.2     Albumin (g/dL)   Date Value   01/03/2020 3.4      Sodium (mmol/L)   Date Value   06/17/2020 138   01/03/2020 138    Renal  Urea Nitrogen (mg/dL)   Date Value   06/17/2020 8   01/03/2020 13     Creatinine (mg/dL)   Date Value   06/17/2020 0.61   06/16/2020 0.65   01/03/2020 0.59     Additional  Ketones Urine (mg/dL)   Date Value   01/03/2020 20 (A)        B/P: 140/72, T: 98.1, P: 64, R: 16      MEDICATIONS  Medications reviewed:    acetaminophen  975 mg Oral Q8H     alvimopan  12 mg Oral BID     enoxaparin ANTICOAGULANT  40 mg Subcutaneous Q24H     hydrochlorothiazide  25 mg Oral Daily     losartan  50 mg Oral Daily     scopolamine  1 patch Transdermal Once     sertraline  50 mg Oral Daily     sodium chloride (PF)  3 mL Intracatheter Q8H        lactated ringers 75 mL/hr at 06/16/20 1942        NUTRITION DIAGNOSIS:  Food and nutrition-related knowledge deficit related to question lack of consistent exposure to nutrition education as evidenced by low fiber consult.      NUTRITION INTERVENTIONS  Recommendations / Nutrition Prescription  Diet per CRS.  Protein focus as able.      Implementation  Nutrition education: Unable to provide as above, unable to connect with patient on multiple attempts.  Low fiber diet handout attached to AVS for discharge.    Nutrition Goals  Patient to verbalize 2 high fiber foods to avoid while on low fiber diet.      MONITORING AND EVALUATION:  Progress towards goals will be monitored and evaluated per protocol and Practice Guidelines          Della Moise RDN, LD  Clinical Dietitian  3rd floor/ICU: [phone]  All other floors: [phone]  Weekend/holiday: [phone]

## 2020-06-18 NOTE — PROGRESS NOTES
Patient hospitalized following a colectomy. Cleared for discharge to home today per MD. Discharge instructions, medications, and follow-ups reviewed with patient in detail. Discharge medications, including Oxycodone, were filled here and given to patient at discharge. Patient verbalized understanding of discharge instructions. Belongings were returned to patient at time of discharge. Friend providing transport home.

## 2020-06-18 NOTE — PLAN OF CARE
"/61 (BP Location: Left arm)   Pulse 64   Temp 98.3  F (36.8  C) (Oral)   Resp 16   Ht 1.626 m (5' 4\")   Wt 83.5 kg (184 lb)   SpO2 93%   BMI 31.58 kg/m     Rn 1900 to 2300: Alert and oriented. Up ad raman. Surgical sites well approximated with no drainage. Pain managed with tylenol and ice. Denied having any real bowel movement. Voiding adequately.   "

## 2020-06-18 NOTE — PLAN OF CARE
End of Shift Summary  For vital signs and complete assessments, please see documentation flowsheets.     Pertinent assessments: AVSS. PIV SL. PRN oxy for pain. Lap sites dermabonded. Voiding. Liquid stool & passing gas.  Major Shift Events: Zofran for mild nausea.   Treatment Plan: Pain control, return of bowel function, Entereg, lovenox    Discharge Readiness: Likely discharge  Expected Discharge Date: Today  Discharge Disposition: Home with Self care  Barriers/Criteria for discharge: none

## 2020-06-19 NOTE — DISCHARGE SUMMARY
[facility] Discharge Summary      Juli Mullen MRN# [id number]   Age: 59 year old YOB: 1960     Date of Admission:  6/16/2020  Date of Discharge::  6/18/2020  3:22 PM  Admitting Physician:  Helen Hooks MD  Discharge Physician:  Helen Hooks MD     PCP:  Radha Mckeon    Disposition: Patient discharged from [facility] to home in stable condition.        Primary Diagnosis:   Recurrent complicated diverticulitis with prior abscess.             Discharge Medications:   Discharge Medication List as of 6/18/2020  1:44 PM      START taking these medications    Details   oxyCODONE (ROXICODONE) 5 MG tablet Take 1 tablet (5 mg) by mouth every 4 hours as needed for moderate to severe pain, Disp-6 tablet,R-0, Local Print         CONTINUE these medications which have NOT CHANGED    Details   acetaminophen (TYLENOL) 325 MG tablet Take 2 tablets (650 mg) by mouth every 4 hours as needed for mild pain, OTC      acyclovir (ZOVIRAX) 400 MG tablet Take 400 mg by mouth 3 times daily as needed (flare), Historical      hydrochlorothiazide (HYDRODIURIL) 25 MG tablet Take 25 mg by mouth daily, Historical      ibuprofen (ADVIL/MOTRIN) 400 MG tablet Take 1 tablet (400 mg) by mouth every 6 hours as needed for moderate pain, OTC      losartan (COZAAR) 50 MG tablet Take 50 mg by mouth daily, Historical      sertraline (ZOLOFT) 50 MG tablet Take 50 mg by mouth daily, Historical      traZODone (DESYREL) 50 MG tablet Take 50 mg by mouth nightly as needed for sleep, Historical      propranolol (INDERAL) 10 MG tablet Take 1 tablet by mouth daily as needed, Historical         STOP taking these medications       ciprofloxacin (CIPRO) 500 MG tablet Comments:   Reason for Stopping:         metroNIDAZOLE (FLAGYL) 500 MG tablet Comments:   Reason for Stopping:         neomycin (MYCIFRADIN) 500 MG tablet Comments:   Reason for Stopping:         ondansetron (ZOFRAN-ODT) 4 MG ODT tab Comments:   Reason for  Stopping:                      Follow Up, Special Instructions:   Discharge diet: Low residue   Discharge activity: No heavy lifting, pushing, pulling for 6 week(s)   Discharge follow-up: Follow up with Dr. Hooks in 3-4 weeks   Wound care: Ice to area for comfort  Keep wound clean and dry              Procedures:   Procedure(s): Laparoscopic-assisted sigmoid resection with primary coloproctostomy at 14 cm from the anal verge.       No other procedures performed during this admission            Consultations:   None          Brief Hospital Summary:   Patient is a 59 year old woman who underwent Laparoscopic-assisted sigmoid resection with primary coloproctostomy at 14 cm from the anal verge on 6/17/20 by Dr. Hooks.   There were no immediate complications during this procedure.    Please refer to the full operative summary for details.  The patient's hospital course was unremarkable.  Pain was controlled on oral pain regimen.  She was tolerating a low fiber diet.  Bowel function had returned prior to discharge.  She recovered as anticipated and experienced no post-operative complications.         Attestation:  I have reviewed today's vital signs, notes, medications, labs and imaging.    Gisele Cole PA-C  [facility]          ADDENDUM:  Length of stay: 2 days  Indicate Y or N for the following:  UTI  No  C diff  No  PNA  No  SSI No  DVT No  PE  No  CVA No  MI No  Enterocutaneous fistula  No  Peripheral nerve injury  No  Abscess (not adjacent to anastomosis)  No  Leak No    Treated with:   Antibiotics N/A   Drain  N/A   Reoperation  N/A  Death within 30 days No  Reintubation  No  Reoperation  No   Procedure n/a

## 2020-06-26 ENCOUNTER — HOSPITAL ENCOUNTER (OUTPATIENT)
Dept: LAB | Facility: CLINIC | Age: 60
Discharge: HOME | End: 2020-06-26
Attending: COLON & RECTAL SURGERY | Admitting: COLON & RECTAL SURGERY
Payer: COMMERCIAL

## 2020-06-26 DIAGNOSIS — R30.0 DYSURIA: Primary | ICD-10-CM

## 2020-06-26 DIAGNOSIS — R30.0 DYSURIA: ICD-10-CM

## 2020-06-26 LAB
ALBUMIN UR-MCNC: 20 MG/DL
APPEARANCE UR: CLEAR
BILIRUB UR QL STRIP: NEGATIVE
COLOR UR AUTO: YELLOW
GLUCOSE UR STRIP-MCNC: NEGATIVE MG/DL
HGB UR QL STRIP: ABNORMAL
KETONES UR STRIP-MCNC: ABNORMAL MG/DL
LEUKOCYTE ESTERASE UR QL STRIP: NEGATIVE
MUCOUS THREADS #/AREA URNS LPF: PRESENT /LPF
NITRATE UR QL: NEGATIVE
PH UR STRIP: 6.5 PH (ref 5–7)
RBC #/AREA URNS AUTO: 3 /HPF (ref 0–2)
SOURCE: ABNORMAL
SP GR UR STRIP: 1.01 (ref 1–1.03)
SQUAMOUS #/AREA URNS AUTO: 3 /HPF (ref 0–1)
UROBILINOGEN UR STRIP-MCNC: 2 MG/DL (ref 0–2)
WBC #/AREA URNS AUTO: 2 /HPF (ref 0–5)

## 2020-06-26 PROCEDURE — 81001 URINALYSIS AUTO W/SCOPE: CPT | Performed by: COLON & RECTAL SURGERY

## 2021-01-03 ENCOUNTER — HEALTH MAINTENANCE LETTER (OUTPATIENT)
Age: 61
End: 2021-01-03

## 2021-10-10 ENCOUNTER — HEALTH MAINTENANCE LETTER (OUTPATIENT)
Age: 61
End: 2021-10-10

## 2022-01-29 ENCOUNTER — HEALTH MAINTENANCE LETTER (OUTPATIENT)
Age: 62
End: 2022-01-29

## 2022-03-26 ENCOUNTER — HEALTH MAINTENANCE LETTER (OUTPATIENT)
Age: 62
End: 2022-03-26

## 2022-09-17 ENCOUNTER — HEALTH MAINTENANCE LETTER (OUTPATIENT)
Age: 62
End: 2022-09-17

## 2023-05-06 ENCOUNTER — HEALTH MAINTENANCE LETTER (OUTPATIENT)
Age: 63
End: 2023-05-06

## 2024-02-25 ENCOUNTER — HEALTH MAINTENANCE LETTER (OUTPATIENT)
Age: 64
End: 2024-02-25

## (undated) DEVICE — ESU GROUND PAD ADULT W/CORD E7507

## (undated) DEVICE — SPONGE LAP 18X18" X8435

## (undated) DEVICE — GLOVE ESTEEM POWDER FREE SMT 6.0  2D72PT60

## (undated) DEVICE — KIT ENDO TURNOVER/PROCEDURE W/CLEAN A SCOPE LINERS 103888

## (undated) DEVICE — LINEN DRAPE 54X72" 5467

## (undated) DEVICE — DRAPE MAYO STAND 23X54 8337

## (undated) DEVICE — KIT PATIENT POSITIONING PIGAZZI LATEX FREE 40580

## (undated) DEVICE — SU VICRYL 2-0 TIE 12X18" J905T

## (undated) DEVICE — SYSTEM CLEARIFY VISUALIZATION 21-345

## (undated) DEVICE — ENDO TROCAR SLEEVE KII Z-THREADED 05X100MM CTS02

## (undated) DEVICE — DRAPE LEGGINGS 8421

## (undated) DEVICE — PROTECTOR ARM ONE-STEP TRENDELENBURG 40418

## (undated) DEVICE — SOL NACL 0.9% IRRIG 3000ML BAG 2B7477

## (undated) DEVICE — Device

## (undated) DEVICE — SUCTION TIP POOLE K770

## (undated) DEVICE — CATH TRAY FOLEY SURESTEP 16FR DRAIN BAG STATOCK A899916

## (undated) DEVICE — ENDO FORCEP ENDOJAW BIOPSY 2.8MMX230CM FB-220U

## (undated) DEVICE — ENDO TROCAR FIRST ENTRY KII FIOS Z-THRD 05X100MM CTF03

## (undated) DEVICE — LINEN POUCH DBL 5427

## (undated) DEVICE — STPL RELOAD REG TISSUE ECHELON 60 X 3.6MM BLUE GST60B

## (undated) DEVICE — LINEN HALF SHEET 5512

## (undated) DEVICE — STPL SINGLE USE 28MM W/3.5MM EEA2835

## (undated) DEVICE — ADH SKIN CLOSURE PREMIERPRO EXOFIN 1.0ML 3470

## (undated) DEVICE — SU MONOCRYL 4-0 PS-2 27" UND Y426H

## (undated) DEVICE — SU VICRYL 0 UR-6 27" J603H

## (undated) DEVICE — SU PDS II 0 CTX 60" Z990G

## (undated) DEVICE — SYR BULB IRRIG 50ML LATEX FREE 0035280

## (undated) DEVICE — DRAPE SLEEVE 599

## (undated) DEVICE — KIT SIGMOIDOSCOPE W/OBTURATOR 18FR SUCTION KI521/10

## (undated) DEVICE — TUBING SUCTION 12"X1/4" N612

## (undated) DEVICE — STPL POWERED ECHELON 60MM PSEE60A

## (undated) DEVICE — ENDO TROCAR BLUNT TIP KII BALLOON 12X100MM C0R47

## (undated) DEVICE — GLOVE PROTEXIS W/NEU-THERA 7.0  2D73TE70

## (undated) DEVICE — BAG CLEAR TRASH 1.3M 39X33" P4040C

## (undated) DEVICE — LINEN TOWEL PACK X10 5473

## (undated) DEVICE — SU VICRYL 3-0 SH 27" J316H

## (undated) DEVICE — SUCTION IRR STRYKERFLOW II W/TIP 250-070-520

## (undated) DEVICE — ESU LIGASURE LAPAROSCOPIC BLUNT TIP SEALER 5MMX37CM LF1837

## (undated) DEVICE — ESU PENCIL W/HOLSTER E2350H

## (undated) DEVICE — SUCTION TIP YANKAUER W/O VENT K86

## (undated) DEVICE — DRAPE IOBAN INCISE 23X17" 6650EZ

## (undated) RX ORDER — EPHEDRINE SULFATE 50 MG/ML
INJECTION, SOLUTION INTRAMUSCULAR; INTRAVENOUS; SUBCUTANEOUS
Status: DISPENSED
Start: 2020-06-16

## (undated) RX ORDER — SCOLOPAMINE TRANSDERMAL SYSTEM 1 MG/1
PATCH, EXTENDED RELEASE TRANSDERMAL
Status: DISPENSED
Start: 2020-06-16

## (undated) RX ORDER — LIDOCAINE HYDROCHLORIDE 10 MG/ML
INJECTION, SOLUTION EPIDURAL; INFILTRATION; INTRACAUDAL; PERINEURAL
Status: DISPENSED
Start: 2020-06-16

## (undated) RX ORDER — GLYCOPYRROLATE 0.2 MG/ML
INJECTION INTRAMUSCULAR; INTRAVENOUS
Status: DISPENSED
Start: 2020-06-16

## (undated) RX ORDER — NEOSTIGMINE METHYLSULFATE 1 MG/ML
VIAL (ML) INJECTION
Status: DISPENSED
Start: 2020-06-16

## (undated) RX ORDER — HEPARIN SODIUM 5000 [USP'U]/.5ML
INJECTION, SOLUTION INTRAVENOUS; SUBCUTANEOUS
Status: DISPENSED
Start: 2020-06-16

## (undated) RX ORDER — PROPOFOL 10 MG/ML
INJECTION, EMULSION INTRAVENOUS
Status: DISPENSED
Start: 2020-06-16

## (undated) RX ORDER — ONDANSETRON 2 MG/ML
INJECTION INTRAMUSCULAR; INTRAVENOUS
Status: DISPENSED
Start: 2020-06-16

## (undated) RX ORDER — ACETAMINOPHEN 325 MG/1
TABLET ORAL
Status: DISPENSED
Start: 2020-06-16

## (undated) RX ORDER — FENTANYL CITRATE 50 UG/ML
INJECTION, SOLUTION INTRAMUSCULAR; INTRAVENOUS
Status: DISPENSED
Start: 2020-02-24

## (undated) RX ORDER — DEXAMETHASONE SODIUM PHOSPHATE 4 MG/ML
INJECTION, SOLUTION INTRA-ARTICULAR; INTRALESIONAL; INTRAMUSCULAR; INTRAVENOUS; SOFT TISSUE
Status: DISPENSED
Start: 2020-06-16

## (undated) RX ORDER — FENTANYL CITRATE 50 UG/ML
INJECTION, SOLUTION INTRAMUSCULAR; INTRAVENOUS
Status: DISPENSED
Start: 2020-06-16

## (undated) RX ORDER — SIMETHICONE 40MG/0.6ML
SUSPENSION, DROPS(FINAL DOSAGE FORM)(ML) ORAL
Status: DISPENSED
Start: 2020-02-24